# Patient Record
Sex: FEMALE | Race: WHITE | HISPANIC OR LATINO | URBAN - METROPOLITAN AREA
[De-identification: names, ages, dates, MRNs, and addresses within clinical notes are randomized per-mention and may not be internally consistent; named-entity substitution may affect disease eponyms.]

---

## 2017-01-23 ENCOUNTER — OUTPATIENT (OUTPATIENT)
Dept: OUTPATIENT SERVICES | Facility: HOSPITAL | Age: 52
LOS: 1 days | Discharge: HOME | End: 2017-01-23

## 2017-06-27 DIAGNOSIS — G44.59 OTHER COMPLICATED HEADACHE SYNDROME: ICD-10-CM

## 2017-11-13 ENCOUNTER — OUTPATIENT (OUTPATIENT)
Dept: OUTPATIENT SERVICES | Facility: HOSPITAL | Age: 52
LOS: 1 days | Discharge: HOME | End: 2017-11-13

## 2017-11-13 DIAGNOSIS — R10.11 RIGHT UPPER QUADRANT PAIN: ICD-10-CM

## 2017-12-07 ENCOUNTER — OUTPATIENT (OUTPATIENT)
Dept: OUTPATIENT SERVICES | Facility: HOSPITAL | Age: 52
LOS: 1 days | Discharge: HOME | End: 2017-12-07

## 2017-12-07 DIAGNOSIS — Z12.31 ENCOUNTER FOR SCREENING MAMMOGRAM FOR MALIGNANT NEOPLASM OF BREAST: ICD-10-CM

## 2018-01-30 ENCOUNTER — EMERGENCY (EMERGENCY)
Facility: HOSPITAL | Age: 53
LOS: 1 days | Discharge: HOME | End: 2018-01-30

## 2018-01-30 DIAGNOSIS — Z90.49 ACQUIRED ABSENCE OF OTHER SPECIFIED PARTS OF DIGESTIVE TRACT: ICD-10-CM

## 2018-01-30 DIAGNOSIS — R10.32 LEFT LOWER QUADRANT PAIN: ICD-10-CM

## 2018-01-30 DIAGNOSIS — N20.0 CALCULUS OF KIDNEY: ICD-10-CM

## 2018-02-07 PROBLEM — Z00.00 ENCOUNTER FOR PREVENTIVE HEALTH EXAMINATION: Status: ACTIVE | Noted: 2018-02-07

## 2018-02-21 ENCOUNTER — APPOINTMENT (OUTPATIENT)
Dept: UROLOGY | Facility: CLINIC | Age: 53
End: 2018-02-21

## 2019-01-13 ENCOUNTER — EMERGENCY (EMERGENCY)
Facility: HOSPITAL | Age: 54
LOS: 0 days | Discharge: HOME | End: 2019-01-13
Attending: EMERGENCY MEDICINE | Admitting: EMERGENCY MEDICINE

## 2019-01-13 VITALS
TEMPERATURE: 97 F | DIASTOLIC BLOOD PRESSURE: 83 MMHG | OXYGEN SATURATION: 97 % | RESPIRATION RATE: 18 BRPM | HEART RATE: 84 BPM | SYSTOLIC BLOOD PRESSURE: 151 MMHG

## 2019-01-13 DIAGNOSIS — R06.00 DYSPNEA, UNSPECIFIED: ICD-10-CM

## 2019-01-13 DIAGNOSIS — T78.1XXA OTHER ADVERSE FOOD REACTIONS, NOT ELSEWHERE CLASSIFIED, INITIAL ENCOUNTER: ICD-10-CM

## 2019-01-13 DIAGNOSIS — R11.10 VOMITING, UNSPECIFIED: ICD-10-CM

## 2019-01-13 DIAGNOSIS — Z87.891 PERSONAL HISTORY OF NICOTINE DEPENDENCE: ICD-10-CM

## 2019-01-13 DIAGNOSIS — Z88.2 ALLERGY STATUS TO SULFONAMIDES: ICD-10-CM

## 2019-01-13 DIAGNOSIS — K21.9 GASTRO-ESOPHAGEAL REFLUX DISEASE WITHOUT ESOPHAGITIS: ICD-10-CM

## 2019-01-13 RX ORDER — DEXAMETHASONE 0.5 MG/5ML
10 ELIXIR ORAL ONCE
Qty: 0 | Refills: 0 | Status: COMPLETED | OUTPATIENT
Start: 2019-01-13 | End: 2019-01-13

## 2019-01-13 RX ORDER — FAMOTIDINE 10 MG/ML
20 INJECTION INTRAVENOUS ONCE
Qty: 0 | Refills: 0 | Status: COMPLETED | OUTPATIENT
Start: 2019-01-13 | End: 2019-01-13

## 2019-01-13 RX ORDER — EPINEPHRINE 0.3 MG/.3ML
0.3 INJECTION INTRAMUSCULAR; SUBCUTANEOUS
Qty: 1 | Refills: 0 | OUTPATIENT
Start: 2019-01-13

## 2019-01-13 RX ADMIN — FAMOTIDINE 104 MILLIGRAM(S): 10 INJECTION INTRAVENOUS at 04:41

## 2019-01-13 RX ADMIN — Medication 102 MILLIGRAM(S): at 05:01

## 2019-01-13 NOTE — ED ADULT TRIAGE NOTE - CHIEF COMPLAINT QUOTE
Pt came c/o allergic reaction after eating strawberries at 10 pm, feels like her throat is closing and has a hoarse voice x 2 days.

## 2019-01-13 NOTE — ED ADULT NURSE NOTE - DOES PATIENT HAVE ADVANCE DIRECTIVE
No No complaints No complaints No complaints No complaints No complaints No complaints No complaints No complaints No complaints No complaints No complaints No complaints No complaints

## 2019-01-13 NOTE — ED PROVIDER NOTE - ATTENDING CONTRIBUTION TO CARE
52 yo female with viral laryngitis presenting with nasal congestion associated with feeling of thightness in her throat after eating a strawberry.  Denies skin rash, swelling of  lips/tongue, difficulties breathing or swallowing, no N/V or abdominal pain.   Took Benadryl PTA, in ED was further treated with resolutions of symptoms.  Nml exam other than hoarse voice which patient states has been present for days along with URI like symptoms .

## 2019-01-13 NOTE — ED PROVIDER NOTE - PHYSICAL EXAMINATION
CONST: Pt resting comfortably, horse voice  EYES: PERRL, EOMI, Sclera and conjunctiva clear.   ENT: No nasal discharge. TM's clear B/L without drainage. Oropharynx normal appearing, no erythema or exudates. No abscess or swelling. Uvula midline.  NECK: Non-tender, no meningeal signs. normal ROM. supple   CARD: S1 S2  RESP: Equal BS B/L, No wheezes, rhonchi or rales. No distress  GI: Soft, non-tender, non-distended. normal BS  MS: Normal ROM in all extremities. pulses 2 +. no calf tenderness or swelling  SKIN: Warm, dry, no acute rashes. Good turgor  NEURO: A&Ox3, No focal deficits. Strength 5/5 with no sensory deficits.

## 2019-01-13 NOTE — ED ADULT NURSE NOTE - NSIMPLEMENTINTERV_GEN_ALL_ED
Implemented All Universal Safety Interventions:  [place] to call system. Call bell, personal items and telephone within reach. Instruct patient to call for assistance. Room bathroom lighting operational. Non-slip footwear when patient is off stretcher. Physically safe environment: no spills, clutter or unnecessary equipment. Stretcher in lowest position, wheels locked, appropriate side rails in place.

## 2019-01-13 NOTE — ED PROVIDER NOTE - OBJECTIVE STATEMENT
53y F PMH Glaucoma, GERD and recently sick with viral infx presents with difficulty breathing. Pt states she vomited yesterday from her viral infx and has had a sore throat since but today she felt as through her throat was closing after eating strawberries. She had a previous episode after eating grapes in the past that spontaneously resolved. She was unable to fall asleep because of an increased pressure in her throat and difficulty breathing. She took Benadryl with minimal relief and came to the ED. Now she presents with a horse voice and contined pressure to the Left side of her throat. She denies fever, HA, change in vision, tongue swelling, rash, puritis, SOB, CP, n/d/c

## 2019-01-13 NOTE — ED PROVIDER NOTE - PROGRESS NOTE DETAILS
pt seen and examined; 53 yold female to ED Pmhx glaucoma, c/o hoarse voice, mild difficutly breathing, itchy throat started tonight after eating strawberries; has had similar reaction in past with grapes; pt took benedryl 50mg and sx have improved since arrival to Ed; pt deneis fever chills, cough, chest pain;   pt seen and examined; no stridor; pt given decadron, pepcid; will observe Eager to go home, acute symptoms resolved, d./c home, epipen prescribed.

## 2019-01-13 NOTE — ED PROVIDER NOTE - NSFOLLOWUPINSTRUCTIONS_ED_ALL_ED_FT
Follow up with primary care provider in 1-2 days.    Allergic Reaction    An allergic reaction is an abnormal reaction to a substance (allergen) by the body's defense system. Common allergens include medicines, food, insect bites or stings, and blood products. The body releases certain proteins into the blood that can cause a variety of symptoms such as an itchy rash, wheezing, swelling of the face/lips/tongue/throat, abdominal pain, nausea or vomiting. An allergic reaction is usually treated with medication. If your health care provider prescribed you an epinephrine injection device, make sure to keep it with you at all times.    SEEK IMMEDIATE MEDICAL CARE IF YOU HAVE THE FOLLOWING SYMPTOMS: allergic reaction severe enough that required you to use epinephrine, tightness in your chest, swelling around your lips/tongue/throat, abdominal pain, vomiting or diarrhea, or lightheadedness/dizziness. These symptoms may represent a serious problem that is an emergency. Do not wait to see if the symptoms will go away. Use your auto-injector pen or anaphylaxis kit as you have been instructed, and get medical help right away. Call your local emergency services (911 in the [place] Do not drive yourself to the hospital.

## 2019-01-13 NOTE — ED PROVIDER NOTE - MEDICAL DECISION MAKING DETAILS
54 yo female with suspected allergic reaction to strawberry.  Symptoms resolved, stable on d./c home.  Epipen prescribed,

## 2019-03-11 ENCOUNTER — OUTPATIENT (OUTPATIENT)
Dept: OUTPATIENT SERVICES | Facility: HOSPITAL | Age: 54
LOS: 1 days | Discharge: HOME | End: 2019-03-11

## 2019-03-11 DIAGNOSIS — Z12.31 ENCOUNTER FOR SCREENING MAMMOGRAM FOR MALIGNANT NEOPLASM OF BREAST: ICD-10-CM

## 2019-03-13 ENCOUNTER — OUTPATIENT (OUTPATIENT)
Dept: OUTPATIENT SERVICES | Facility: HOSPITAL | Age: 54
LOS: 1 days | Discharge: HOME | End: 2019-03-13

## 2019-03-15 DIAGNOSIS — Z13.820 ENCOUNTER FOR SCREENING FOR OSTEOPOROSIS: ICD-10-CM

## 2019-03-15 DIAGNOSIS — N95.9 UNSPECIFIED MENOPAUSAL AND PERIMENOPAUSAL DISORDER: ICD-10-CM

## 2020-03-13 ENCOUNTER — OUTPATIENT (OUTPATIENT)
Dept: OUTPATIENT SERVICES | Facility: HOSPITAL | Age: 55
LOS: 1 days | Discharge: HOME | End: 2020-03-13
Payer: COMMERCIAL

## 2020-03-13 DIAGNOSIS — Z12.31 ENCOUNTER FOR SCREENING MAMMOGRAM FOR MALIGNANT NEOPLASM OF BREAST: ICD-10-CM

## 2020-03-13 PROCEDURE — 77067 SCR MAMMO BI INCL CAD: CPT | Mod: 26

## 2020-03-13 PROCEDURE — 77063 BREAST TOMOSYNTHESIS BI: CPT | Mod: 26

## 2020-03-16 ENCOUNTER — OUTPATIENT (OUTPATIENT)
Dept: OUTPATIENT SERVICES | Facility: HOSPITAL | Age: 55
LOS: 1 days | Discharge: HOME | End: 2020-03-16
Payer: COMMERCIAL

## 2020-03-16 DIAGNOSIS — R92.8 OTHER ABNORMAL AND INCONCLUSIVE FINDINGS ON DIAGNOSTIC IMAGING OF BREAST: ICD-10-CM

## 2020-03-16 PROCEDURE — G0279: CPT | Mod: 26

## 2020-03-16 PROCEDURE — 76642 ULTRASOUND BREAST LIMITED: CPT | Mod: 26,RT

## 2020-03-16 PROCEDURE — 77065 DX MAMMO INCL CAD UNI: CPT | Mod: 26,RT

## 2020-04-04 ENCOUNTER — TRANSCRIPTION ENCOUNTER (OUTPATIENT)
Age: 55
End: 2020-04-04

## 2020-09-21 ENCOUNTER — OUTPATIENT (OUTPATIENT)
Dept: OUTPATIENT SERVICES | Facility: HOSPITAL | Age: 55
LOS: 1 days | Discharge: HOME | End: 2020-09-21
Payer: COMMERCIAL

## 2020-09-21 DIAGNOSIS — R92.8 OTHER ABNORMAL AND INCONCLUSIVE FINDINGS ON DIAGNOSTIC IMAGING OF BREAST: ICD-10-CM

## 2020-09-21 PROCEDURE — 76642 ULTRASOUND BREAST LIMITED: CPT | Mod: 26,RT

## 2021-01-08 ENCOUNTER — APPOINTMENT (OUTPATIENT)
Dept: BREAST CENTER | Facility: CLINIC | Age: 56
End: 2021-01-08
Payer: COMMERCIAL

## 2021-01-08 DIAGNOSIS — Z87.891 PERSONAL HISTORY OF NICOTINE DEPENDENCE: ICD-10-CM

## 2021-01-08 DIAGNOSIS — Z80.1 FAMILY HISTORY OF MALIGNANT NEOPLASM OF TRACHEA, BRONCHUS AND LUNG: ICD-10-CM

## 2021-01-08 DIAGNOSIS — N62 HYPERTROPHY OF BREAST: ICD-10-CM

## 2021-01-08 DIAGNOSIS — Z86.69 PERSONAL HISTORY OF OTHER DISEASES OF THE NERVOUS SYSTEM AND SENSE ORGANS: ICD-10-CM

## 2021-01-08 PROCEDURE — 99203 OFFICE O/P NEW LOW 30 MIN: CPT

## 2021-01-08 PROCEDURE — 99072 ADDL SUPL MATRL&STAF TM PHE: CPT

## 2021-01-08 RX ORDER — ACETAZOLAMIDE 250 MG/1
TABLET ORAL
Refills: 0 | Status: ACTIVE | COMMUNITY

## 2021-01-08 RX ORDER — IBUPROFEN AND FAMOTIDINE 800; 26.6 MG/1; MG/1
TABLET, COATED ORAL
Refills: 0 | Status: ACTIVE | COMMUNITY

## 2021-01-08 NOTE — PAST MEDICAL HISTORY
[Menarche Age ____] : age at menarche was [unfilled] [Menopause Age____] : age at menopause was [unfilled] [Total Preg ___] : G[unfilled] [Live Births ___] : P[unfilled]  [Age At Live Birth ___] : Age at live birth: [unfilled] [History of Hormone Replacement Treatment] : has no history of hormone replacement treatment [FreeTextEntry5] :   [FreeTextEntry6] : yes  [FreeTextEntry7] : denies [FreeTextEntry8] : yes x 6 months

## 2021-01-08 NOTE — DATA REVIEWED
[FreeTextEntry1] : EXAM: MG MAMMO SCREEN W ZOE BI#\par \par \par PROCEDURE DATE: 03/13/2020\par \par \par \par INTERPRETATION: HISTORY:\par Bilateral MG MAMMO SCREEN W ZOE BI# was performed. Patient is 54 years old\par and is seen for screening. The patient has no personal history of cancer.\par The patient has a history of right needle biopsy at age 48 - benign. The\par patient has no family history of breast cancer.\par \par RISK ASSESSMENT:\par NCI Lifetime Risk: 13.1\par Tyrer-Cuzick Lifetime Risk: 17.0\par \par CLINICAL BREAST EXAM:\par The patient reports her last clinical breast exam was performed within the\par past month.\par \par COMPARISON STUDIES:\par The present examination has been compared to prior imaging studies performed\par at [facility] on 11/25/2016, 12/07/2017 and\par 03/11/2019.\par \par MAMMOGRAM FINDINGS:\par Mammography was performed including the following views: bilateral\par craniocaudal with tomosynthesis, bilateral mediolateral oblique with\par tomosynthesis, and bilateral anterior mediolateral oblique. The examination\par includes digital synthetic 2D and digital tomosynthesis 3D images.\par Additional imaging analysis was performed using CAD (computer-aided\par detection) software.\par \par The breasts are heterogeneously dense, which may obscure small masses.\par \par There is a focal asymmetry seen in the inferior of the right breast at\par middle third, depth region.\par \par No suspicious mass, grouping of calcifications, or other abnormality is\par identified in the left breast.\par \par IMPRESSION:\par Focal asymmetry in the right breast requires additional evaluation.\par \par RECOMMENDATION:\par Patient will be recalled for additional mammographic views and, if\par indicated, breast ultrasound.\par \par ASSESSMENT:\par BI-RADS Category 0: Incomplete: Needs Additional Imaging Evaluation\par \par The patient will be notified of these results by telephone, and will also be\par mailed a written summary in layman's terms.\par \par \par \par \par \par \par MILLICENT ROLON M.D., RESIDENT RADIOLOGIST\par This document has been electronically signed.\par CARMENCITA BENOIT M.D., ATTENDING RADIOLOGIST\par This document has been electronically signed. Mar 13 2020 4:30PM\par \par EXAM: US BREAST LIMITED RT\par EXAM: MG MAMMO DIAG W ZOE RT#\par \par \par PROCEDURE DATE: 03/16/2020\par \par \par \par INTERPRETATION: Clinical History / Reason for exam: Focal asymmetry medial\par aspect of the right breast.\par \par The patient reports her last clinical breast examination was performed one\par month ago.\par \par Family history: No family history of breast cancer.\par \par Comparisons: Mammograms dating back to 2017.\par \par Views obtained: right full field digital 2D and digital tomosynthesis images\par as well as spot views.\par \par Computer-aided detection was utilized in the interpretation of this\par examination.\par \par Breast composition: The breasts are heterogeneously dense, which may obscure\par small masses.\par \par Findings:\par \par Mammogram:\par The area of mammographic concern in the central right breast effaces on spot\par views likely normal fibroglandular tissue.\par No suspicious mass, microcalcifications or areas of architectural distortion\par is seen the right breast.\par \par Ultrasound:\par \par Targeted unilateral right breast ultrasound was performed.\par \par No sonographic abnormality noted at the area of mammographic concern in the\par central right breast.\par Incidentally noted at 1:00 N5 is an oval circumscribed mass measuring 0.9 x\par 0.8 x 0.3 cm. Short interval follow-up with ultrasound recommended.\par \par Impression:\par Oval circumscribed mass (sonographically occult) 1:00 right breast. Short\par interval follow-up with ultrasound recommended.\par \par Recommendation: Follow-up breast ultrasound in 6 months.\par \par BI-RADS category 3: Probably Benign\par \par \par \par The above findings and recommendations were discussed with the patient at\par the time of the examination.\par \par \par \par \par \par \par CARMENCITA BENOIT M.D., ATTENDING RADIOLOGIST\par This document has been electronically signed. Mar 16 2020 3:54PM\par \par EXAM: MG US BREAST LIMITED RT\par \par \par PROCEDURE DATE: 09/21/2020\par \par \par \par INTERPRETATION: Clinical History / Reason for exam: Follow-up right breast mass.\par \par The patient reports her last clinical breast examination was performed about uncertain.\par \par Family history: There is no family history of breast cancer.\par \par Comparisons: Breast ultrasound dating back to 2016.\par \par Findings:\par \par Ultrasound:\par \par Targeted unilateral right breast ultrasound was performed.\par \par Right breast:\par At 1:00 N5 there is redemonstration of stable oval circumscribed mass measuring 0.9 x 0.4 x 0.3 cm. Short interval follow-up recommended\par \par Impression: Stable right 1:00 mass as described above. Short interval follow-up recommended.\par \par Recommendation: Follow-up bilateral diagnostic mammogram and ultrasound in 6 months.\par \par BI-RADS category 3: Probably Benign\par \par \par The above findings and recommendations were discussed with the patient at the time of the examination.\par \par \par \par \par \par CARMENCITA BENOIT M.D., ATTENDING RADIOLOGIST\par This document has been electronically signed. Sep 21 2020 2:01PM

## 2021-01-08 NOTE — ASSESSMENT
[FreeTextEntry1] : Estela is a 55 F with a R breast BIRADS 3 abnormality. \par \par There was no suspicious abnormalities palpated within either breast on exam today. \par Her most recent imaging was a R breast US on 9/21/2020 which revealed a stable oval mass @1N5, measuring 9 x 4 x 3 mm, deemed BIRADS 3. \par \par She will be due for a b/l dx mammogram and US on 3/13/2021.  This will be scheduled for her today.  I will have her follow up in 6 months if this imaging is unrevealing. \par \par We discussed BIRADS 3 lesions.  These lesions have a 2% chance of harboring malignancy.  There is always an option to obtain a tissue sample with a biopsy to confirm the diagnosis.   The procedure was described in detail including the placement of a tissue marker clip.  The risks of the procedure, including but not limited to bleeding and infection were also explained.  She is not interested in biopsy at this time and agrees to continue with short-term interval imaging.\par \par We discussed dense breasts.  Increasing breast density has been found to increase ones risk of breast cancer, but at this time, there is no clear indication for additional imaging in this setting, as both US and MRI have not been found to improve survival.  One can consider bilateral screening US.  However, out of 1000 women screened, the use of routine US will only identify an additional 3-5 cancers.  The use of US was found to increase the likelihood of undergoing more imaging and more biopsies.  She does have dense breasts.  We have decided to proceed with screening bilateral breast US at this time.  This will be scheduled with her next screening mammogram.\par \par She is otherwise at an average risk for breast cancer and should continue with annual screening mammograms and US. \par \par All of her questions were answered.  She knows to call with any further questions or concerns. \par \par PLAN: \par -plastic surgery referral for possible breast reduction\par -b/l dx mammogram and US On 3/13/2021\par -if above imaging is unrevealing, then she can follow up in 6 months for a CBE

## 2021-01-08 NOTE — HISTORY OF PRESENT ILLNESS
[FreeTextEntry1] : Estela is a 55 F with a R breast BIRADS 3 abnormality. \par \par Her work up was as follows: \par 3/13/2020 -- b/l screening mammogram \par -heterogenously dense breasts\par -focal asymmetry in inferior R breast\par -no suspicious mass, microcalcifications or architectural distortion in L \par BIRADS 0 \par \par 3/16/2020 -- R dx mammogram and US \par -asymmetry effaces on spot compression, benign \par R US \par -@1N5, incidental oval circumscribed mass, measures 9 x 8 x 3 mm \par BIRADS 3\par \par 2020 -- R US \par -@1N5, stable oval mass, measuring 9 x 4 x 3 mm \par BIRADS 3. \par \par She has no breast related complaints at this time.  She denies any breast pain, has not palpated any new palpable masses in either breast and denies any nipple discharge or retraction.  She does have chronic neck, shoulder and back pain, partially due to the weight of her breasts. \par \par HISTORICAL RISK FACTORS: \par -prior R breast biopsy in past, reportedly benign, no prior surgeries \par -no family history of breast or ovarian cancer \par -, age at first live birth was 39 \par -no prior OCP use \par -no gyn surgeries\par

## 2021-01-08 NOTE — PHYSICAL EXAM
[Normocephalic] : normocephalic [Atraumatic] : atraumatic [EOMI] : extra ocular movement intact [No Supraclavicular Adenopathy] : no supraclavicular adenopathy [No Cervical Adenopathy] : no cervical adenopathy [Examined in the supine and seated position] : examined in the supine and seated position [Symmetrical] : symmetrical [No dominant masses] : no dominant masses in right breast  [No dominant masses] : no dominant masses left breast [No Nipple Retraction] : no left nipple retraction [No Nipple Discharge] : no left nipple discharge [No Axillary Lymphadenopathy] : no left axillary lymphadenopathy [Soft] : abdomen soft [Not Tender] : non-tender [No Edema] : no edema [No Rashes] : no rashes [No Ulceration] : no ulceration [de-identified] : no suspicious abnormalities palpated within either breast

## 2021-03-22 ENCOUNTER — OUTPATIENT (OUTPATIENT)
Dept: OUTPATIENT SERVICES | Facility: HOSPITAL | Age: 56
LOS: 1 days | Discharge: HOME | End: 2021-03-22
Payer: COMMERCIAL

## 2021-03-22 DIAGNOSIS — R92.8 OTHER ABNORMAL AND INCONCLUSIVE FINDINGS ON DIAGNOSTIC IMAGING OF BREAST: ICD-10-CM

## 2021-03-22 PROCEDURE — 77066 DX MAMMO INCL CAD BI: CPT | Mod: 26

## 2021-03-22 PROCEDURE — 76641 ULTRASOUND BREAST COMPLETE: CPT | Mod: 26,50

## 2021-03-22 PROCEDURE — G0279: CPT | Mod: 26

## 2021-03-23 DIAGNOSIS — Z78.0 ASYMPTOMATIC MENOPAUSAL STATE: ICD-10-CM

## 2021-03-23 DIAGNOSIS — M89.9 DISORDER OF BONE, UNSPECIFIED: ICD-10-CM

## 2021-03-23 DIAGNOSIS — Z13.820 ENCOUNTER FOR SCREENING FOR OSTEOPOROSIS: ICD-10-CM

## 2021-07-01 ENCOUNTER — OUTPATIENT (OUTPATIENT)
Dept: OUTPATIENT SERVICES | Facility: HOSPITAL | Age: 56
LOS: 1 days | Discharge: HOME | End: 2021-07-01
Payer: COMMERCIAL

## 2021-07-01 DIAGNOSIS — N20.0 CALCULUS OF KIDNEY: ICD-10-CM

## 2021-07-01 PROCEDURE — 76770 US EXAM ABDO BACK WALL COMP: CPT | Mod: 26

## 2021-09-22 ENCOUNTER — OUTPATIENT (OUTPATIENT)
Dept: OUTPATIENT SERVICES | Facility: HOSPITAL | Age: 56
LOS: 1 days | Discharge: HOME | End: 2021-09-22
Payer: COMMERCIAL

## 2021-09-22 DIAGNOSIS — R92.2 INCONCLUSIVE MAMMOGRAM: ICD-10-CM

## 2021-09-22 PROCEDURE — 76642 ULTRASOUND BREAST LIMITED: CPT | Mod: 26,50

## 2021-12-07 ENCOUNTER — APPOINTMENT (OUTPATIENT)
Dept: BREAST CENTER | Facility: CLINIC | Age: 56
End: 2021-12-07
Payer: COMMERCIAL

## 2021-12-07 VITALS
SYSTOLIC BLOOD PRESSURE: 165 MMHG | DIASTOLIC BLOOD PRESSURE: 93 MMHG | BODY MASS INDEX: 29.57 KG/M2 | WEIGHT: 184 LBS | TEMPERATURE: 97.3 F | HEIGHT: 66 IN

## 2021-12-07 PROCEDURE — 99212 OFFICE O/P EST SF 10 MIN: CPT

## 2021-12-07 NOTE — PHYSICAL EXAM
[Normocephalic] : normocephalic [Atraumatic] : atraumatic [EOMI] : extra ocular movement intact [No Supraclavicular Adenopathy] : no supraclavicular adenopathy [No Cervical Adenopathy] : no cervical adenopathy [Examined in the supine and seated position] : examined in the supine and seated position [Symmetrical] : symmetrical [No dominant masses] : no dominant masses in right breast  [No dominant masses] : no dominant masses left breast [No Nipple Retraction] : no left nipple retraction [No Nipple Discharge] : no left nipple discharge [No Axillary Lymphadenopathy] : no left axillary lymphadenopathy [Soft] : abdomen soft [Not Tender] : non-tender [No Edema] : no edema [No Rashes] : no rashes [No Ulceration] : no ulceration [de-identified] : no suspicious abnormalities palpated within either breast

## 2021-12-07 NOTE — HISTORY OF PRESENT ILLNESS
[FreeTextEntry1] : Estela is a 55 F with a R breast BIRADS 3 abnormality. \par \par Her work up was as follows: \par 3/13/2020 -- b/l screening mammogram \par -heterogenously dense breasts\par -focal asymmetry in inferior R breast\par -no suspicious mass, microcalcifications or architectural distortion in L \par BIRADS 0 \par \par 3/16/2020 -- R dx mammogram and US \par -asymmetry effaces on spot compression, benign \par R US \par -@1N5, incidental oval circumscribed mass, measures 9 x 8 x 3 mm \par BIRADS 3\par \par 2020 -- R US \par -@1N5, stable oval mass, measuring 9 x 4 x 3 mm \par BIRADS 3. \par \par She has no breast related complaints at this time.  She denies any breast pain, has not palpated any new palpable masses in either breast and denies any nipple discharge or retraction.  She does have chronic neck, shoulder and back pain, partially due to the weight of her breasts. \par \par HISTORICAL RISK FACTORS: \par -prior R breast biopsy in past, reportedly benign, no prior surgeries \par -no family history of breast or ovarian cancer \par -, age at first live birth was 39 \par -no prior OCP use \par -no gyn surgeries\par \par \par INTERVAL HISTORY:\par 2021 --\par \par Estela is a 56 F with a BIRADS 3 abnormality. \par She has no breast related complaints at this time.  She did have some right breast pain but this has resolved, she has not palpated any new masses in either breast and denies any nipple discharge or retraction.\par \par Her most recent imaging is as follows:\par B/L Breast Sono - 2021:\par Right breast:\par -At 1:00 N5 there is redemonstration of stable oval circumscribed mass measuring 0.6 x 0.8 x 0.4 cm. Short interval follow-up recommended. This is been stable for 18 months, supporting a probably benign etiology.\par Left breast:\par At 10:00 N5 there is redemonstration of a stable oval circumscribed mass measuring 0.9 x 0.8 x 0.4 cm. Short interval follow-up recommended. This is been stable for 6 months, supporting a probably benign etiology.\par BI-RADS category 3: Probably Benign\par \par

## 2021-12-07 NOTE — DATA REVIEWED
[FreeTextEntry1] : EXAM:  MG US BREAST LIMITED BI      \par \par \par PROCEDURE DATE:  09/22/2021  \par \par \par \par INTERPRETATION:  Clinical History / Reason for exam: Short-term follow-up bilateral breast masses.\par \par Right breast:\par At 1:00 N5 there is redemonstration of stable oval circumscribed mass measuring 0.6 x 0.8 x 0.4 cm. Short interval follow-up recommended.\par This is been stable for 18 months, supporting a probably benign etiology.\par \par Left breast:\par At 10:00 N5 there is redemonstration of a stable oval circumscribed mass measuring 0.9 x 0.8 x 0.4 cm. Short interval follow-up recommended. This is been stable for 6 months, supporting a probably benign etiology.\par \par IMPRESSION: Probably benign bilateral breast masses.\par \par Recommendation: Follow-up breast ultrasound in 6 months. At that time the patient will be due for annual mammography.\par \par BI-RADS category 3: Probably Benign\par \par The above findings and recommendations were discussed with the patient at the time of the exam. She is indicating that she might opt for immediate biopsy as opposed to continued short-term follow-up.\par \par

## 2021-12-07 NOTE — ASSESSMENT
[FreeTextEntry1] : Estela is a 56 F with a  BIRADS 3 abnormality. \par \par There was no suspicious abnormalities palpated within either breast on exam today. \par \par Her most recent imaging is as follows:\par B/L Breast Sono - 09/22/2021:\par Right breast:\par -At 1:00 N5 there is redemonstration of stable oval circumscribed mass measuring 0.6 x 0.8 x 0.4 cm. Short interval follow-up recommended. This is been stable for 18 months, supporting a probably benign etiology.\par Left breast:\par At 10:00 N5 there is redemonstration of a stable oval circumscribed mass measuring 0.9 x 0.8 x 0.4 cm. Short interval follow-up recommended. This is been stable for 6 months, supporting a probably benign etiology.\par BI-RADS category 3: Probably Benign\par \par \par She will be due for a b/l dx mammogram and US on 3/13/2022.  This will be scheduled for her today.  I will have her follow up after.\par \par We discussed BIRADS 3 lesions.  These lesions have a 2% chance of harboring malignancy.  There is always an option to obtain a tissue sample with a biopsy to confirm the diagnosis.   The procedure was described in detail including the placement of a tissue marker clip.  The risks of the procedure, including but not limited to bleeding and infection were also explained.  She is not interested in biopsy at this time and agrees to continue with short-term interval imaging.\par \par We discussed dense breasts.  Increasing breast density has been found to increase ones risk of breast cancer, but at this time, there is no clear indication for additional imaging in this setting, as both US and MRI have not been found to improve survival.  One can consider bilateral screening US.  However, out of 1000 women screened, the use of routine US will only identify an additional 3-5 cancers.  The use of US was found to increase the likelihood of undergoing more imaging and more biopsies.  She does have dense breasts.  We have decided to proceed with screening bilateral breast US at this time.  This will be scheduled with her next screening mammogram.\par \par She is otherwise at an average risk for breast cancer and should continue with annual screening mammograms and US. \par \par All of her questions were answered.  She knows to call with any further questions or concerns. \par \par PLAN: \par -B/L Dx Mammo & Sono - March 2022\par -follow up after

## 2022-02-20 ENCOUNTER — NON-APPOINTMENT (OUTPATIENT)
Age: 57
End: 2022-02-20

## 2022-02-22 ENCOUNTER — APPOINTMENT (OUTPATIENT)
Dept: OBGYN | Facility: CLINIC | Age: 57
End: 2022-02-22
Payer: COMMERCIAL

## 2022-02-22 VITALS — TEMPERATURE: 97.6 F | HEIGHT: 66 IN | WEIGHT: 190 LBS | BODY MASS INDEX: 30.53 KG/M2

## 2022-02-22 DIAGNOSIS — N95.0 POSTMENOPAUSAL BLEEDING: ICD-10-CM

## 2022-02-22 DIAGNOSIS — Z87.19 PERSONAL HISTORY OF OTHER DISEASES OF THE DIGESTIVE SYSTEM: ICD-10-CM

## 2022-02-22 DIAGNOSIS — Z87.09 PERSONAL HISTORY OF OTHER DISEASES OF THE RESPIRATORY SYSTEM: ICD-10-CM

## 2022-02-22 PROCEDURE — 76830 TRANSVAGINAL US NON-OB: CPT

## 2022-02-22 PROCEDURE — 99213 OFFICE O/P EST LOW 20 MIN: CPT | Mod: 25

## 2022-02-22 NOTE — HISTORY OF PRESENT ILLNESS
[FreeTextEntry1] : 56-year-old -0-2-1 here for follow-up GYN visit.  She underwent menopause in  and did not experience any bleeding until recently when last week when she was applying vaginal lubricant that she gets over-the-counter to help with her menopausal dryness and she noticed blood on her finger that was bright red.  The bleeding stopped on its own that day and it has not since recurred.

## 2022-02-22 NOTE — PROCEDURE
[Transvaginal Ultrasound] : transvaginal ultrasound [L: ___ cm] : L: [unfilled] cm [W: ___cm] : W: [unfilled] cm [H: ___ cm] : H: [unfilled] cm [FreeTextEntry9] : Postmenopausal bleeding [FreeTextEntry5] : 38 cc, 2.2 mm EMS, homogenous appearing, normal-appearing cervix, normal-appearing cul-de-sac [FreeTextEntry7] : 1.5 x 1.4 x 1.9 cm, normal-appearing [FreeTextEntry8] : 1.8 x 1.6 x 1.2 cm, normal-appearing

## 2022-02-22 NOTE — PHYSICAL EXAM
[Chaperone Present] : A chaperone was present in the examining room during all aspects of the physical examination [Appropriately responsive] : appropriately responsive [Alert] : alert [No Acute Distress] : no acute distress [Oriented x3] : oriented x3 [Labia Majora] : normal [Labia Minora] : normal [Normal] : normal [Uterine Adnexae] : normal [FreeTextEntry5] : Nonlabored [Vulvar Atrophy] : vulvar atrophy [Atrophy] : atrophy

## 2022-02-22 NOTE — PLAN
[FreeTextEntry1] : Transvaginal ultrasound done in office to better visualize pelvic anatomic structure overall unremarkable, thin homogenous appearing endometrial lining measuring 2.2 mm, will continue to monitor.  We discussed that given ultrasound findings at this point no tissue sampling is required however if she does continue to bleed endometrial sampling will be necessary for further evaluation and pathologic diagnosis.

## 2022-03-03 ENCOUNTER — APPOINTMENT (OUTPATIENT)
Dept: OBGYN | Facility: CLINIC | Age: 57
End: 2022-03-03
Payer: COMMERCIAL

## 2022-03-03 VITALS
HEART RATE: 72 BPM | SYSTOLIC BLOOD PRESSURE: 122 MMHG | DIASTOLIC BLOOD PRESSURE: 80 MMHG | HEIGHT: 66 IN | BODY MASS INDEX: 30.22 KG/M2 | TEMPERATURE: 98 F | WEIGHT: 188 LBS

## 2022-03-03 DIAGNOSIS — N95.2 POSTMENOPAUSAL ATROPHIC VAGINITIS: ICD-10-CM

## 2022-03-03 DIAGNOSIS — R82.998 OTHER ABNORMAL FINDINGS IN URINE: ICD-10-CM

## 2022-03-03 LAB
BILIRUB UR QL STRIP: NORMAL
CLARITY UR: NORMAL
COLLECTION METHOD: NORMAL
GLUCOSE UR-MCNC: NORMAL
HCG UR QL: 0.2 EU/DL
HGB UR QL STRIP.AUTO: NORMAL
KETONES UR-MCNC: NORMAL
LEUKOCYTE ESTERASE UR QL STRIP: NORMAL
NITRITE UR QL STRIP: NORMAL
PH UR STRIP: 6.5
PROT UR STRIP-MCNC: NORMAL
SP GR UR STRIP: 1.02

## 2022-03-03 PROCEDURE — 99396 PREV VISIT EST AGE 40-64: CPT

## 2022-03-03 PROCEDURE — 81003 URINALYSIS AUTO W/O SCOPE: CPT | Mod: QW

## 2022-03-03 NOTE — PHYSICAL EXAM
[Chaperone Present] : A chaperone was present in the examining room during all aspects of the physical examination [Appropriately responsive] : appropriately responsive [Alert] : alert [No Acute Distress] : no acute distress [Soft] : soft [Non-tender] : non-tender [Non-distended] : non-distended [Examination Of The Breasts] : a normal appearance [No Masses] : no breast masses were palpable [Vulvar Atrophy] : vulvar atrophy [Labia Majora] : normal [Labia Minora] : normal [Atrophy] : atrophy [No Bleeding] : There was no active vaginal bleeding [Normal] : normal [Uterine Adnexae] : normal

## 2022-03-03 NOTE — DISCUSSION/SUMMARY
[FreeTextEntry1] : instructions for use of new medication reviewed with patient\par started pt on premarin vaginal cream  pt seen last week , saw scant bleeding,  most likely from vaginal applicator \par see note

## 2022-03-03 NOTE — HISTORY OF PRESENT ILLNESS
[postmenopausal] : postmenopausal [N] : Patient does not use contraception [Y] : Patient is sexually active [Monogamous (Male Partner)] : is monogamous with a male partner [Currently In Menopause] : currently in menopause [Menopause Age: ____] : age at menopause was [unfilled] [Yes] : Patient has concerns regarding sex [Currently Active] : currently active [Men] : men [No] : No [Difficulty with [place]] : difficulty with intercourse [Vaginal Lubrication] : vaginal lubrication [TextBox_18] : using vaginal moisturizer , helps a little [FreeTextEntry1] : dyspareunia

## 2022-03-05 LAB
APPEARANCE: CLEAR
BACTERIA UR CULT: NORMAL
BACTERIA: NEGATIVE
BILIRUBIN URINE: NEGATIVE
BLOOD URINE: NEGATIVE
COLOR: NORMAL
GLUCOSE QUALITATIVE U: NEGATIVE
HYALINE CASTS: 2 /LPF
KETONES URINE: NEGATIVE
LEUKOCYTE ESTERASE URINE: ABNORMAL
MICROSCOPIC-UA: NORMAL
NITRITE URINE: NEGATIVE
PH URINE: 6.5
PROTEIN URINE: NEGATIVE
RED BLOOD CELLS URINE: 1 /HPF
SPECIFIC GRAVITY URINE: 1.02
SQUAMOUS EPITHELIAL CELLS: 1 /HPF
UROBILINOGEN URINE: NORMAL
WHITE BLOOD CELLS URINE: 5 /HPF

## 2022-03-10 ENCOUNTER — APPOINTMENT (OUTPATIENT)
Dept: RHEUMATOLOGY | Facility: CLINIC | Age: 57
End: 2022-03-10
Payer: COMMERCIAL

## 2022-03-10 VITALS
OXYGEN SATURATION: 96 % | TEMPERATURE: 97.8 F | DIASTOLIC BLOOD PRESSURE: 80 MMHG | WEIGHT: 185 LBS | HEIGHT: 66 IN | BODY MASS INDEX: 29.73 KG/M2 | SYSTOLIC BLOOD PRESSURE: 120 MMHG | HEART RATE: 81 BPM

## 2022-03-10 DIAGNOSIS — M51.26 OTHER INTERVERTEBRAL DISC DISPLACEMENT, LUMBAR REGION: ICD-10-CM

## 2022-03-10 DIAGNOSIS — R76.8 OTHER SPECIFIED ABNORMAL IMMUNOLOGICAL FINDINGS IN SERUM: ICD-10-CM

## 2022-03-10 DIAGNOSIS — Z86.39 PERSONAL HISTORY OF OTHER ENDOCRINE, NUTRITIONAL AND METABOLIC DISEASE: ICD-10-CM

## 2022-03-10 PROCEDURE — 99205 OFFICE O/P NEW HI 60 MIN: CPT

## 2022-03-10 RX ORDER — BRIMONIDINE TARTRATE, TIMOLOL MALEATE 2; 5 MG/ML; MG/ML
SOLUTION/ DROPS OPHTHALMIC
Refills: 0 | Status: ACTIVE | COMMUNITY

## 2022-03-10 RX ORDER — MULTIVIT-MIN/IRON/FOLIC ACID/K 18-600-40
400 CAPSULE ORAL
Refills: 0 | Status: ACTIVE | COMMUNITY

## 2022-03-10 RX ORDER — ASCORBIC ACID 500 MG
500 TABLET ORAL
Refills: 0 | Status: ACTIVE | COMMUNITY

## 2022-03-10 RX ORDER — NETARSUDIL AND LATANOPROST OPHTHALMIC SOLUTION, 0.02%/0.005% .2; .05 MG/ML; MG/ML
SOLUTION/ DROPS OPHTHALMIC; TOPICAL
Refills: 0 | Status: ACTIVE | COMMUNITY

## 2022-03-10 RX ORDER — POTASSIUM CITRATE 10 MEQ/1
10 MEQ TABLET, EXTENDED RELEASE ORAL
Refills: 0 | Status: ACTIVE | COMMUNITY

## 2022-03-10 RX ORDER — BROMFENAC SODIUM 0.7 MG/ML
SOLUTION/ DROPS OPHTHALMIC
Refills: 0 | Status: ACTIVE | COMMUNITY

## 2022-03-10 NOTE — PHYSICAL EXAM
[General Appearance - Alert] : alert [General Appearance - In No Acute Distress] : in no acute distress [Sclera] : the sclera and conjunctiva were normal [PERRL With Normal Accommodation] : pupils were equal in size, round, and reactive to light [Extraocular Movements] : extraocular movements were intact [Outer Ear] : the ears and nose were normal in appearance [Oropharynx] : the oropharynx was normal [Neck Appearance] : the appearance of the neck was normal [Neck Cervical Mass (___cm)] : no neck mass was observed [Jugular Venous Distention Increased] : there was no jugular-venous distention [Thyroid Diffuse Enlargement] : the thyroid was not enlarged [Thyroid Nodule] : there were no palpable thyroid nodules [Auscultation Breath Sounds / Voice Sounds] : lungs were clear to auscultation bilaterally [Heart Rate And Rhythm] : heart rate was normal and rhythm regular [Heart Sounds] : normal S1 and S2 [Heart Sounds Gallop] : no gallops [Murmurs] : no murmurs [Heart Sounds Pericardial Friction Rub] : no pericardial rub [Bowel Sounds] : normal bowel sounds [Abdomen Soft] : soft [Abdomen Tenderness] : non-tender [Abdomen Mass (___ Cm)] : no abdominal mass palpated [Abnormal Walk] : normal gait [Nail Clubbing] : no clubbing  or cyanosis of the fingernails [Musculoskeletal - Swelling] : no joint swelling seen [Motor Tone] : muscle strength and tone were normal [] : no rash [Affect] : the affect was normal [Mood] : the mood was normal

## 2022-03-12 LAB — CYTOLOGY CVX/VAG DOC THIN PREP: ABNORMAL

## 2022-03-14 ENCOUNTER — RESULT REVIEW (OUTPATIENT)
Age: 57
End: 2022-03-14

## 2022-03-14 ENCOUNTER — OUTPATIENT (OUTPATIENT)
Dept: OUTPATIENT SERVICES | Facility: HOSPITAL | Age: 57
LOS: 1 days | Discharge: HOME | End: 2022-03-14
Payer: COMMERCIAL

## 2022-03-14 DIAGNOSIS — R92.8 OTHER ABNORMAL AND INCONCLUSIVE FINDINGS ON DIAGNOSTIC IMAGING OF BREAST: ICD-10-CM

## 2022-03-14 PROCEDURE — G0279: CPT | Mod: 26

## 2022-03-14 PROCEDURE — 76641 ULTRASOUND BREAST COMPLETE: CPT | Mod: 26,50

## 2022-03-14 PROCEDURE — 77066 DX MAMMO INCL CAD BI: CPT | Mod: 26

## 2022-03-14 PROCEDURE — 77062 BREAST TOMOSYNTHESIS BI: CPT | Mod: 26

## 2022-03-14 NOTE — HISTORY OF PRESENT ILLNESS
[FreeTextEntry1] : 56 year old female here for positive LEANNA 1:40\par \par \par SHe has neck pain, left arm pain from herniated disc in her cervical and lumbar spine. She sees neurology and pain management for this. She has gone to PT for this hasn't gone in 1 year. SHe was supposed to get nerve ablation in January but deferred due to elevated ocular pressure s/p surgery. She is taking care of her older sister who passed in December. \par \par Reports fatigue, chills, pruritus, muscle spasms\par Denies joint pain, fevers, weight loss, +night sweats, rash, photosensitivity, raynaud's, oral ulcers, nasal ulcers, hair loss, sinus problems, nosebleeds, visual disturbances, +dry eyes, dry mouth

## 2022-03-14 NOTE — ASSESSMENT
[FreeTextEntry1] : 56 year old female who presents for evaluation of LEANNA 1:40\par \par 1. Positive LEANNA\par -Fatigue, myalgias, arm pain\par -Complete serologies for RA, SLE, connective tissue disease\par -F/u 1 year if normal

## 2022-03-24 ENCOUNTER — APPOINTMENT (OUTPATIENT)
Dept: BREAST CENTER | Facility: CLINIC | Age: 57
End: 2022-03-24
Payer: COMMERCIAL

## 2022-03-28 ENCOUNTER — APPOINTMENT (OUTPATIENT)
Dept: BREAST CENTER | Facility: CLINIC | Age: 57
End: 2022-03-28
Payer: COMMERCIAL

## 2022-03-28 VITALS
HEIGHT: 66 IN | BODY MASS INDEX: 29.73 KG/M2 | DIASTOLIC BLOOD PRESSURE: 88 MMHG | WEIGHT: 185 LBS | TEMPERATURE: 97.3 F | SYSTOLIC BLOOD PRESSURE: 138 MMHG

## 2022-03-28 DIAGNOSIS — R92.2 INCONCLUSIVE MAMMOGRAM: ICD-10-CM

## 2022-03-28 DIAGNOSIS — R92.8 OTHER ABNORMAL AND INCONCLUSIVE FINDINGS ON DIAGNOSTIC IMAGING OF BREAST: ICD-10-CM

## 2022-03-28 PROCEDURE — 99212 OFFICE O/P EST SF 10 MIN: CPT

## 2022-03-28 NOTE — PHYSICAL EXAM
[Normocephalic] : normocephalic [Atraumatic] : atraumatic [EOMI] : extra ocular movement intact [Examined in the supine and seated position] : examined in the supine and seated position [Symmetrical] : symmetrical [No dominant masses] : no dominant masses in right breast  [No dominant masses] : no dominant masses left breast [No Nipple Retraction] : no left nipple retraction [No Nipple Discharge] : no left nipple discharge [No Axillary Lymphadenopathy] : no left axillary lymphadenopathy [No Edema] : no edema [No Rashes] : no rashes [No Ulceration] : no ulceration [de-identified] : On physical exam, there are no discrete masses in either breast or axilla. There is no nipple discharge or inversion bilaterally. There are no skin changes bilaterally.\par \par  [de-identified] : accessory breast tissue noted on inspection bilaterally

## 2022-03-28 NOTE — DATA REVIEWED
[FreeTextEntry1] : EXAM:  MG US BREAST COMPLETE BI\par EXAM:  MG MAMMO DIAG W ZOE BI#\par \par \par PROCEDURE DATE:  03/14/2022\par \par \par \par INTERPRETATION:  Clinical History / Reason for exam: Patient presents for follow-up of probably benign bilateral breast masses since March 2020.\par \par The patient reports her last clinical breast examination was performed one month ago.\par \par Diagnostic bilateral mammogram including tomography was performed and submitted for evaluation.\par \par Computer-aided detection was utilized in the interpretation of this examination.\par \par Comparison is made to the prior examinations dating back to 2016.\par \par Breast composition:The breasts are heterogeneously dense, which may obscure small masses.\par \par No suspicious masses, areas of architectural distortion or abnormal groups of microcalcifications are seen in either breast.\par \par Targeted Bilateral Breast Sonogram:\par \par Again identified are stable probably benign oval circumscribed hypoechoic masses in both breasts and are as follows:\par \par RIGHT BREAST:\par \par 1:00 location 5 cm from the nipple measuring 0.9 cm x 0.6 cm x 0.3 cm.\par \par There is also a previously biopsied mass at the 9-10 o'clock location 4 cm from the nipple which is without change measuring 0.9 cm x 1.0 cm x 0.4 cm.\par \par LEFT BREAST:\par \par 10:00 location 5 cm the nipple measuring 0.8 cm x 0.8 cm x 0.4 cm.\par \par These masses have demonstrated greater than 2 years of stability and are benign.\par \par Evaluation of both axillary regions demonstrates normal-appearing lymph nodes.\par \par Impression: Benign oval circumscribed hypoechoic mass in the right breast at the 1:00 location 5 cm the the nipple and left breast at the 10:00 location 5 cm from the nipple which are without significant change since March 2020 as above.\par \par No evidence of malignancy.\par \par Recommendation: Unless otherwise indicated by clinical findings, annual screening mammography recommended.\par \par BI-RADS Category 2: Benign\par \par

## 2022-03-28 NOTE — PAST MEDICAL HISTORY
[History of Hormone Replacement Treatment] : has no history of hormone replacement treatment [FreeTextEntry5] :   [FreeTextEntry6] : yes  [FreeTextEntry7] : denies [FreeTextEntry8] : yes x 6 months

## 2022-03-28 NOTE — HISTORY OF PRESENT ILLNESS
[FreeTextEntry1] : Estela is a 55 F with a R breast BIRADS 3 abnormality. \par \par Her work up was as follows: \par 3/13/2020 -- b/l screening mammogram \par -heterogenously dense breasts\par -focal asymmetry in inferior R breast\par -no suspicious mass, microcalcifications or architectural distortion in L \par BIRADS 0 \par \par 3/16/2020 -- R dx mammogram and US \par -asymmetry effaces on spot compression, benign \par R US \par -@1N5, incidental oval circumscribed mass, measures 9 x 8 x 3 mm \par BIRADS 3\par \par 2020 -- R US \par -@1N5, stable oval mass, measuring 9 x 4 x 3 mm \par BIRADS 3. \par \par She has no breast related complaints at this time.  She denies any breast pain, has not palpated any new palpable masses in either breast and denies any nipple discharge or retraction.  She does have chronic neck, shoulder and back pain, partially due to the weight of her breasts. \par \par HISTORICAL RISK FACTORS: \par -prior R breast biopsy in past, reportedly benign, no prior surgeries \par -no family history of breast or ovarian cancer \par -, age at first live birth was 39 \par -no prior OCP use \par -no gyn surgeries\par \par \par INTERVAL HISTORY:\par 2021 --\par \par Estela is a 56 F with a BIRADS 3 abnormality. \par She has no breast related complaints at this time.  She did have some right breast pain but this has resolved, she has not palpated any new masses in either breast and denies any nipple discharge or retraction.\par \par Her most recent imaging is as follows:\par B/L Breast Sono - 2021:\par Right breast:\par -At 1:00 N5 there is redemonstration of stable oval circumscribed mass measuring 0.6 x 0.8 x 0.4 cm. Short interval follow-up recommended. This is been stable for 18 months, supporting a probably benign etiology.\par Left breast:\par At 10:00 N5 there is redemonstration of a stable oval circumscribed mass measuring 0.9 x 0.8 x 0.4 cm. Short interval follow-up recommended. This is been stable for 6 months, supporting a probably benign etiology.\par BI-RADS category 3: Probably Benign\par \par INTERVAL HISTORY 3/28/22\par Estela is a 56 F with a BIRADS 3 abnormality now deemed BIRADS2\par She has no breast related complaints at this time.  She did have some right breast pain but this has resolved, she has not palpated any new masses in either breast and denies any nipple discharge or retraction.\par \par Her imaging is as follows:\par 2022 b/l dx mammo and US\par -breasts are heterogeneously dense\par B/L US:\par Again identified are stable probably benign oval circumscribed hypoechoic masses in both breasts and are as follows:\par \par RIGHT BREAST:\par -@1N5 measuring 0.9 cm x 0.6 cm x 0.3 cm.\par -previously biopsied mass at the 9-10 o'clock location 4 cm from the nipple which is without change measuring 0.9 cm x 1.0 cm x 0.4 cm.\par \par LEFT BREAST:\par -@10N 5 measuring 0.8 cm x 0.8 cm x 0.4 cm.\par BIRADS2 (stability since 2020 )\par

## 2022-03-28 NOTE — ASSESSMENT
[FreeTextEntry1] : Estela is a 56 F with a  BIRADS 3 abnormality now deemed BIRADS 2\par \par There was no suspicious abnormalities palpated within either breast on exam today. \par \par Her imaging is as follows:\par 03/14/2022 b/l dx mammo and US\par -breasts are heterogeneously dense\par B/L US:\par Again identified are stable probably benign oval circumscribed hypoechoic masses in both breasts and are as follows:\par \par RIGHT BREAST:\par -@1N5 measuring 0.9 cm x 0.6 cm x 0.3 cm.\par -previously biopsied mass at the 9-10 o'clock location 4 cm from the nipple which is without change measuring 0.9 cm x 1.0 cm x 0.4 cm.\par \par LEFT BREAST:\par -@10N 5 measuring 0.8 cm x 0.8 cm x 0.4 cm.\par BIRADS2 (stability since March 2020 )\par \par \par She will be due for a b/l  mammogram and US on 3/15/2023.  This will be scheduled for her today.  I will have her follow up after.\par She also would like to see plastic surgeon for possible breast reduction, she was given contact for referral \par AS REVIEW:\par We discussed BIRADS 3 lesions.  These lesions have a 2% chance of harboring malignancy.  There is always an option to obtain a tissue sample with a biopsy to confirm the diagnosis.   The procedure was described in detail including the placement of a tissue marker clip.  The risks of the procedure, including but not limited to bleeding and infection were also explained.  She is not interested in biopsy at this time and agrees to continue with short-term interval imaging.\par \par We discussed dense breasts.  Increasing breast density has been found to increase ones risk of breast cancer, but at this time, there is no clear indication for additional imaging in this setting, as both US and MRI have not been found to improve survival.  One can consider bilateral screening US.  However, out of 1000 women screened, the use of routine US will only identify an additional 3-5 cancers.  The use of US was found to increase the likelihood of undergoing more imaging and more biopsies.  She does have dense breasts.  We have decided to proceed with screening bilateral breast US at this time.  This will be scheduled with her next screening mammogram.\par \par She is otherwise at an average risk for breast cancer and should continue with annual screening mammograms and US. \par \par All of her questions were answered.  She knows to call with any further questions or concerns. \par \par PLAN: \par -b/l  mammogram and US on 3/15/2023\par -follow up after \par -plastic surgery referral for possible breast reduction

## 2022-07-13 ENCOUNTER — APPOINTMENT (OUTPATIENT)
Dept: ENDOCRINOLOGY | Facility: CLINIC | Age: 57
End: 2022-07-13

## 2022-07-13 ENCOUNTER — OUTPATIENT (OUTPATIENT)
Dept: OUTPATIENT SERVICES | Facility: HOSPITAL | Age: 57
LOS: 1 days | Discharge: HOME | End: 2022-07-13

## 2022-07-13 VITALS
TEMPERATURE: 97.8 F | DIASTOLIC BLOOD PRESSURE: 81 MMHG | WEIGHT: 185 LBS | BODY MASS INDEX: 29.73 KG/M2 | SYSTOLIC BLOOD PRESSURE: 142 MMHG | HEIGHT: 66 IN

## 2022-07-13 DIAGNOSIS — M85.80 OTHER SPECIFIED DISORDERS OF BONE DENSITY AND STRUCTURE, UNSPECIFIED SITE: ICD-10-CM

## 2022-07-13 DIAGNOSIS — E55.9 VITAMIN D DEFICIENCY, UNSPECIFIED: ICD-10-CM

## 2022-07-13 NOTE — DATA REVIEWED
[FreeTextEntry1] : 3/2021 high calcium and PTh 25 vit D 15 \par 2/2022: glucose 114 A1c 6%  calcium 11.2 TSH 1.27  Ft4 1.1  Alk phos 116 25 vit D 29 + LEANNA\par \par \par DXA scan ( 2021) osteopenia ( in PACS )

## 2022-07-13 NOTE — ASSESSMENT
[FreeTextEntry1] : # Diabetes work up - new onset \par - HbA1C - 6.0 (Feb 2022)\par - FBS - 114 (Feb 2022)\par - Repeat complete diabetic blood workup\par - diabetes education \par - strict diet and exercise regimen \par - advised weight loss \par - will follow up in clinic with repeat blood work\par \par #Dyslipidemia \par - elevated LDL - 124\par - TG- 224\par - Total Cholesterol - 208\par - HDl - 50  (Feb 2022)\par - Will follow up in 6 weeks with repeat values \par \par # Hyperparathyroid / hypercalcemia evaluation and Vit-D deficiency \par - Calcium - 11.4 (Feb 2022)\par - PTH - 105 (March 2021)\par - Vitamin D -  29 (Feb 2022)\par - Osteopenia on DXA scan \par - Patient advised to repeat blood work for primary hyperparathyroidism, 24hr urine calcium levels and Usg neck to look for parathyroid adenoma  \par \par \par

## 2022-07-13 NOTE — END OF VISIT
[] : Resident [FreeTextEntry3] : 57 year old female who present for evaluation of hypercalcemia, high PTH , vit d deficiency , preDM and DL \par # hypercalcemia with inappropriate high PTH \par - likely primary hyperparathyroidism , + kidney stones in the past and is on potassium citrate by urology  ,+ osteopenia on DXA scan \par - reviewed hypercalcemia symptoms and complications of hyperparathyroidism , will redo labs , 24 hour urine , neck US \par - depending on results will do sestamibi , hydration advised , she will continue vit D 800- 1000 IU daily \par \par #preDM DL: reviewed diet and exercise  [Time Spent: ___ minutes] : I have spent [unfilled] minutes of time on the encounter.

## 2022-07-13 NOTE — HISTORY OF PRESENT ILLNESS
[FreeTextEntry1] : Referred by primary care - Dr. Nathan Jean\par For diabetic workup, high cholesterol and high calcium levels  [de-identified] : 57 yr ms. Del Castillo, presented to the clinic for Diabetes and hypercalcemia workup.\par \par She complaints of nausea (?dyspepsia; on and off) and constipation (on and off) for 8 years .\par \par She had a long history of neck and lower back pain, Neck pain radiates to bilateral upper limbs (L>R): Lower back pain radiates to both the lower limbs. is on supplementation with Vit-D for deficiency.\par \par past history of renal calculi - calcium stone; ?secondary to hypercalcemia \par \par She denies fevers, polyuria, polyphagia or polydipsia.\par She is currently post menopausal for the past 8 years. \par \par Endoscopy and colonoscopy done this year - Normal - according to patient \par

## 2022-07-14 DIAGNOSIS — E83.52 HYPERCALCEMIA: ICD-10-CM

## 2022-07-14 DIAGNOSIS — E21.3 HYPERPARATHYROIDISM, UNSPECIFIED: ICD-10-CM

## 2022-07-14 DIAGNOSIS — M85.80 OTHER SPECIFIED DISORDERS OF BONE DENSITY AND STRUCTURE, UNSPECIFIED SITE: ICD-10-CM

## 2022-07-14 DIAGNOSIS — R73.03 PREDIABETES: ICD-10-CM

## 2022-07-14 DIAGNOSIS — E55.9 VITAMIN D DEFICIENCY, UNSPECIFIED: ICD-10-CM

## 2022-07-14 DIAGNOSIS — E11.9 TYPE 2 DIABETES MELLITUS WITHOUT COMPLICATIONS: ICD-10-CM

## 2022-07-14 DIAGNOSIS — E78.5 HYPERLIPIDEMIA, UNSPECIFIED: ICD-10-CM

## 2022-08-30 ENCOUNTER — RESULT REVIEW (OUTPATIENT)
Age: 57
End: 2022-08-30

## 2022-08-30 ENCOUNTER — OUTPATIENT (OUTPATIENT)
Dept: OUTPATIENT SERVICES | Facility: HOSPITAL | Age: 57
LOS: 1 days | Discharge: HOME | End: 2022-08-30

## 2022-08-30 DIAGNOSIS — E21.3 HYPERPARATHYROIDISM, UNSPECIFIED: ICD-10-CM

## 2022-08-30 DIAGNOSIS — E83.52 HYPERCALCEMIA: ICD-10-CM

## 2022-08-30 PROCEDURE — 76536 US EXAM OF HEAD AND NECK: CPT | Mod: 26

## 2022-08-31 ENCOUNTER — APPOINTMENT (OUTPATIENT)
Dept: PAIN MANAGEMENT | Facility: CLINIC | Age: 57
End: 2022-08-31

## 2022-08-31 VITALS
HEIGHT: 66 IN | HEART RATE: 70 BPM | DIASTOLIC BLOOD PRESSURE: 92 MMHG | WEIGHT: 185 LBS | BODY MASS INDEX: 29.73 KG/M2 | SYSTOLIC BLOOD PRESSURE: 150 MMHG

## 2022-08-31 DIAGNOSIS — Z82.49 FAMILY HISTORY OF ISCHEMIC HEART DISEASE AND OTHER DISEASES OF THE CIRCULATORY SYSTEM: ICD-10-CM

## 2022-08-31 DIAGNOSIS — Z83.511 FAMILY HISTORY OF GLAUCOMA: ICD-10-CM

## 2022-08-31 PROCEDURE — 99215 OFFICE O/P EST HI 40 MIN: CPT

## 2022-09-01 NOTE — ASSESSMENT
[FreeTextEntry1] : Ms. Del Castillo  is a 57-year-old woman who presents with a long history of both cervical and lumbar pain.  She has not been seen in the office since February 2021.  At this time she presents with worsening neck and back pain, neck pain mostly localized and lower back pain radiating down the right leg.  She has not had recent imaging therefore I will order updated imaging of the cervical and lumbar spine to delineate any spine pathology and start her on a course of physical therapy.  She will follow-up in 3-4 weeks for re-evaluation and to go over imaging results.  All of her questions were answered and she understood our conversation well.\par \par No medications were given at today's visit.\par \par No interventions ordered at this visit.\par \par Cervical MRI was ordered to delineate spine pathology such as disc displacement and stenosis.\par \par Lumbar MRI with and without contrast was ordered to delineate spine pathology such as disc displacement and stenosis.\par \par Physical therapy of the cervical spine 2-3 times a week for 4-8 weeks stressing a home exercise program of walking, shoulder girdle strengthening,  swimming, elliptical , recumbent bike, Raymundo chi and Yoga. Use things that heat like hot shower or icy heat before rehab, exercising and at the beginning of the day, and ice (ice in a bag never directly on the skin) after activity and at the end of the day.\par \par Physical therapy of the lumbar spine 2-3 times a week for 4-8 weeks stressing a home exercise program of walking, shoulder girdle strengthening,  swimming, elliptical , recumbent bike, Raymundo chi and Yoga. Use things that heat like hot shower or icy heat before rehab, exercising and at the beginning of the day, and ice (ice in a bag never directly on the skin) after activity and at the end of the day.\par \par Thank you for allowing me to assist in the management of this patient. \par \par \par  Best Regards, \par \par \par  Basia Underwood M.D., [facility]\par \par \par  Diplomate, American Board of Physical Medicine and Rehabilitation\par  Diplomate, American Board of Pain Medicine \par  Diplomate, American Board of Pain Management\par

## 2022-09-01 NOTE — DATA REVIEWED
[FreeTextEntry1] : MRI C spine 10/25/2019 Impression: Findings are without appreciable change when compared to November 2017. Mild spinal curvature and mild reversal of lordosis. Multilevel spondylosis. Central C3-4 focal ridge disc indents the spinal cord. Left central C4-5 small disc herniation mildly indents the spinal cord. C5-6 small right central ridge/disc. C6-7 shallow left central ridge disc. \par \par MRI L spine 10/2019- Impression: Mild-to-moderate multilevel spondylosis and multilevel facet arthropathy in the setting of a developmentally borderline spinal canal. L4-5 right central and foraminal shallow disc herniation also extending left central. L3-4 shallow left lateral and foraminal disc herniation. L1-2 right foraminal shallow disc herniation. \par Comparison with October 2017, the L4-5 shallow right and left central disc herniation is more pronounced, otherwise, the findings are stable.\par \par SOAPP (a Screener and Opioid Assessment for Patients with Pain) 8/31/2022. She scored a 0, which is low risk. \par \par Medications that trigger a UDS: Benzodiazepines (Ativan, Xanax, Valium) etc. Barbiturates, Narcotics (Avinza, Butrans, hydrocodone, Codeine, Tala, Methadone, Morphine, MS Contin, Opana, oxycodone, OxyContin, Suboxone, etc.) Pregabalin (Lyrica), Tramadol (Ultracet, Ultram, etc.) Tapentadol (Nucynta) and E-list Drugs (cocaine, THC, etc.) \par  \par Patient has combination of a low risk SOAPP and no risk factors. UDS would be repeated randomly every quarter. \par  \par Risk factors: Bipolar illness, positive for any illicit drugs, history of ETOH and drug abuse, any signs of diversion, sharing medications, selling medications. Non-consistent [place] drug reporting and above 120 mEq of morphine. \par   \par [facility] [place] REGISTRY: Consistent. \par  \par UDS: No data collected\par

## 2022-09-01 NOTE — ASSESSMENT
[FreeTextEntry1] : Ms. Del Castillo  is a 57-year-old woman who presents with a long history of both cervical and lumbar pain.  She has not been seen in the office since February 2021.  At this time she presents with worsening neck and back pain, neck pain mostly localized and lower back pain radiating down the right leg.  She has not had recent imaging therefore I will order updated imaging of the cervical and lumbar spine to delineate any spine pathology and start her on a course of physical therapy.  She will follow-up in 3-4 weeks for re-evaluation and to go over imaging results.  All of her questions were answered and she understood our conversation well.\par \par No medications were given at today's visit.\par \par No interventions ordered at this visit.\par \par Cervical MRI was ordered to delineate spine pathology such as disc displacement and stenosis.\par \par Lumbar MRI with and without contrast was ordered to delineate spine pathology such as disc displacement and stenosis.\par \par Physical therapy of the cervical spine 2-3 times a week for 4-8 weeks stressing a home exercise program of walking, shoulder girdle strengthening,  swimming, elliptical , recumbent bike, Raymundo chi and Yoga. Use things that heat like hot shower or icy heat before rehab, exercising and at the beginning of the day, and ice (ice in a bag never directly on the skin) after activity and at the end of the day.\par \par Physical therapy of the lumbar spine 2-3 times a week for 4-8 weeks stressing a home exercise program of walking, shoulder girdle strengthening,  swimming, elliptical , recumbent bike, Raymundo chi and Yoga. Use things that heat like hot shower or icy heat before rehab, exercising and at the beginning of the day, and ice (ice in a bag never directly on the skin) after activity and at the end of the day.\par \par Thank you for allowing me to assist in the management of this patient. \par \par \par  Best Regards, \par \par \par  Basia Underwood M.D., [facility]\par \par \par  Diplomate, American Board of Physical Medicine and Rehabilitation\par  Diplomate, American Board of Pain Medicine \par  Diplomate, American Board of Pain Management\par

## 2022-09-01 NOTE — DATA REVIEWED
[FreeTextEntry1] : MRI C spine 10/25/2019 Impression: Findings are without appreciable change when compared to November 2017. Mild spinal curvature and mild reversal of lordosis. Multilevel spondylosis. Central C3-4 focal ridge disc indents the spinal cord. Left central C4-5 small disc herniation mildly indents the spinal cord. C5-6 small right central ridge/disc. C6-7 shallow left central ridge disc. \par \par MRI L spine 10/2019- Impression: Mild-to-moderate multilevel spondylosis and multilevel facet arthropathy in the setting of a developmentally borderline spinal canal. L4-5 right central and foraminal shallow disc herniation also extending left central. L3-4 shallow left lateral and foraminal disc herniation. L1-2 right foraminal shallow disc herniation. \par Comparison with October 2017, the L4-5 shallow right and left central disc herniation is more pronounced, otherwise, the findings are stable.\par \par SOAPP (a Screener and Opioid Assessment for Patients with Pain) 8/31/2022. She scored a 0, which is low risk. \par \par Medications that trigger a UDS: Benzodiazepines (Ativan, Xanax, Valium) etc. Barbiturates, Narcotics (Avinza, Butrans, hydrocodone, Codeine, Tala, Methadone, Morphine, MS Contin, Opana, oxycodone, OxyContin, Suboxone, etc.) Pregabalin (Lyrica), Tramadol (Ultracet, Ultram, etc.) Tapentadol (Nucynta) and E-list Drugs (cocaine, THC, etc.) \par  \par Patient has combination of a low risk SOAPP and no risk factors. UDS would be repeated randomly every quarter. \par  \par Risk factors: Bipolar illness, positive for any illicit drugs, history of ETOH and drug abuse, any signs of diversion, sharing medications, selling medications. Non-consistent [place] drug reporting and above 120 mEq of morphine. \par   \par [facility] [place] REGISTRY: Consistent. \par  \par UDS: No data collected\par

## 2022-09-01 NOTE — REASON FOR VISIT
[Follow-Up Visit] : a follow-up pain management visit [FreeTextEntry2] : Pain in neck, lower back and shoulders.

## 2022-09-01 NOTE — DATA REVIEWED
[FreeTextEntry1] : MRI C spine 10/25/2019 Impression: Findings are without appreciable change when compared to November 2017. Mild spinal curvature and mild reversal of lordosis. Multilevel spondylosis. Central C3-4 focal ridge disc indents the spinal cord. Left central C4-5 small disc herniation mildly indents the spinal cord. C5-6 small right central ridge/disc. C6-7 shallow left central ridge disc. \par \par MRI L spine 10/2019- Impression: Mild-to-moderate multilevel spondylosis and multilevel facet arthropathy in the setting of a developmentally borderline spinal canal. L4-5 right central and foraminal shallow disc herniation also extending left central. L3-4 shallow left lateral and foraminal disc herniation. L1-2 right foraminal shallow disc herniation. \par Comparison with October 2017, the L4-5 shallow right and left central disc herniation is more pronounced, otherwise, the findings are stable.\par \par SOAPP (a Screener and Opioid Assessment for Patients with Pain) 8/31/2022. She scored a 0, which is low risk. \par \par Medications that trigger a UDS: Benzodiazepines (Ativan, Xanax, Valium) etc. Barbiturates, Narcotics (Avinza, Butrans, hydrocodone, Codeine, Tala, Methadone, Morphine, MS Contin, Opana, oxycodone, OxyContin, Suboxone, etc.) Pregabalin (Lyrica), Tramadol (Ultracet, Ultram, etc.) Tapentadol (Nucynta) and E-list Drugs (cocaine, THC, etc.) \par  \par Patient has combination of a low risk SOAPP and no risk factors. UDS would be repeated randomly every quarter. \par  \par Risk factors: Bipolar illness, positive for any illicit drugs, history of ETOH and drug abuse, any signs of diversion, sharing medications, selling medications. Non-consistent [place] drug reporting and above 120 mEq of morphine. \par   \par [facility] [place] REGISTRY: Consistent. \par  \par UDS: No data collected\par

## 2022-09-01 NOTE — PHYSICAL EXAM
[Normal Coordination] : normal coordination [Normal DTR UE/LE] : normal DTR UE/LE  [Normal Sensation] : normal sensation [Normal Mood and Affect] : normal mood and affect [Orientated] : orientated [Able to Communicate] : able to communicate [Well Developed] : well developed [Well Nourished] : well nourished [Flexion] : flexion [Extension] : extension [] : patient ambulates without assistive device

## 2022-09-01 NOTE — DATA REVIEWED
[FreeTextEntry1] : MRI C spine 10/25/2019 Impression: Findings are without appreciable change when compared to November 2017. Mild spinal curvature and mild reversal of lordosis. Multilevel spondylosis. Central C3-4 focal ridge disc indents the spinal cord. Left central C4-5 small disc herniation mildly indents the spinal cord. C5-6 small right central ridge/disc. C6-7 shallow left central ridge disc. \par \par MRI L spine 10/2019- Impression: Mild-to-moderate multilevel spondylosis and multilevel facet arthropathy in the setting of a developmentally borderline spinal canal. L4-5 right central and foraminal shallow disc herniation also extending left central. L3-4 shallow left lateral and foraminal disc herniation. L1-2 right foraminal shallow disc herniation. \par Comparison with October 2017, the L4-5 shallow right and left central disc herniation is more pronounced, otherwise, the findings are stable.\par \par SOAPP (a Screener and Opioid Assessment for Patients with Pain) 8/31/2022. She scored a 0, which is low risk. \par \par Medications that trigger a UDS: Benzodiazepines (Ativan, Xanax, Valium) etc. Barbiturates, Narcotics (Avinza, Butrans, hydrocodone, Codeine, Tala, Methadone, Morphine, MS Contin, Opana, oxycodone, OxyContin, Suboxone, etc.) Pregabalin (Lyrica), Tramadol (Ultracet, Ultram, etc.) Tapentadol (Nucynta) and E-list Drugs (cocaine, THC, etc.) \par  \par Patient has combination of a low risk SOAPP and no risk factors. UDS would be repeated randomly every quarter. \par  \par Risk factors: Bipolar illness, positive for any illicit drugs, history of ETOH and drug abuse, any signs of diversion, sharing medications, selling medications. Non-consistent [place] drug reporting and above 120 mEq of morphine. \par   \par [facility] [place] REGISTRY: Consistent. \par  \par UDS: No data collected\par

## 2022-09-01 NOTE — HISTORY OF PRESENT ILLNESS
[FreeTextEntry1] : ORIGINAL PRESENTATION:  Ms. Del Castillo is a 57-year-old woman who presents with a several year history of neck pain radiating in between the shoulder blades and into the left shoulder where she describes burning, stabbing, sharp pain around the left shoulder blade. She also complains of lower back pain, mostly right-sided, which radiates down the right leg to the foot with numbness and tingling. She states the pain is of undetermined origin, moderate to severe, it is nearly constant, in no typical pattern. She describes burning, shooting, sharp, pressure-like pain with numbness in the right leg. She denies any weakness in the upper or lower extremities.\par \par ACTIVITIES: Decreased pain with walking, exercise, relaxation. No change with lying down, standing, sitting, walking, exercise, coughing/sneezing, bowel movements. Can sit for 2 minutes, stand with no pain. She sometimes lies down due to pain. She does not use an assistive device. She is able to perform all AADL. \par \par PRIOR PAIN TREATMENTS: Nerve blocks and injections, specifically trigger point injects gave her no relief. Physical therapy, heat, cold, biofeedback gave her moderate relief. SOLA x 2, 50%relief. \par \par PRIOR PAIN MEDICATIONS: Tylenol, Vioxx, Duexis, Neurontin.\par \par \par TODAY: She presents for a follow up encounter. She has not been seen since 2/23/2021, at which time she underwent right C2-C5 cervical MBB with 100% immediate relief reported.  Prior to this she had undergone cervical MBB on 10/29/2020, which gave her many months of relief. She has also undergone cervical epidural steroid injections in 2019 and 2020, and right L4-5 SNRI's for her LBP, lumbar radiculopathy symptoms.\par \par Today she is complaining of worsening symptomatology regarding the cervical and lumbar spine.  With regards to the Neck she continues to have pain in the paracervical region, R > L. with regards to her lower back, she is complaining of pain that radiates mostly down the right leg with pain settling in the right knee.

## 2022-09-01 NOTE — ASSESSMENT
[FreeTextEntry1] : Ms. Del Castillo  is a 57-year-old woman who presents with a long history of both cervical and lumbar pain.  She has not been seen in the office since February 2021.  At this time she presents with worsening neck and back pain, neck pain mostly localized and lower back pain radiating down the right leg.  She has not had recent imaging therefore I will order updated imaging of the cervical and lumbar spine to delineate any spine pathology and start her on a course of physical therapy.  She will follow-up in 3-4 weeks for re-evaluation and to go over imaging results.  All of her questions were answered and she understood our conversation well.\par \par No medications were given at today's visit.\par \par No interventions ordered at this visit.\par \par Cervical MRI was ordered to delineate spine pathology such as disc displacement and stenosis.\par \par Lumbar MRI with and without contrast was ordered to delineate spine pathology such as disc displacement and stenosis.\par \par Physical therapy of the cervical spine 2-3 times a week for 4-8 weeks stressing a home exercise program of walking, shoulder girdle strengthening,  swimming, elliptical , recumbent bike, Raymundo chi and Yoga. Use things that heat like hot shower or icy heat before rehab, exercising and at the beginning of the day, and ice (ice in a bag never directly on the skin) after activity and at the end of the day.\par \par Physical therapy of the lumbar spine 2-3 times a week for 4-8 weeks stressing a home exercise program of walking, shoulder girdle strengthening,  swimming, elliptical , recumbent bike, Raymundo chi and Yoga. Use things that heat like hot shower or icy heat before rehab, exercising and at the beginning of the day, and ice (ice in a bag never directly on the skin) after activity and at the end of the day.\par \par Thank you for allowing me to assist in the management of this patient. \par \par \par  Best Regards, \par \par \par  Basia Underwood M.D., [facility]\par \par \par  Diplomate, American Board of Physical Medicine and Rehabilitation\par  Diplomate, American Board of Pain Medicine \par  Diplomate, American Board of Pain Management\par

## 2022-09-01 NOTE — ASSESSMENT
[FreeTextEntry1] : Ms. Del Castillo  is a 57-year-old woman who presents with a long history of both cervical and lumbar pain.  She has not been seen in the office since February 2021.  At this time she presents with worsening neck and back pain, neck pain mostly localized and lower back pain radiating down the right leg.  She has not had recent imaging therefore I will order updated imaging of the cervical and lumbar spine to delineate any spine pathology and start her on a course of physical therapy.  She will follow-up in 3-4 weeks for re-evaluation and to go over imaging results.  All of her questions were answered and she understood our conversation well.\par \par No medications were given at today's visit.\par \par No interventions ordered at this visit.\par \par Cervical MRI was ordered to delineate spine pathology such as disc displacement and stenosis.\par \par Lumbar MRI with and without contrast was ordered to delineate spine pathology such as disc displacement and stenosis.\par \par Physical therapy of the cervical spine 2-3 times a week for 4-8 weeks stressing a home exercise program of walking, shoulder girdle strengthening,  swimming, elliptical , recumbent bike, Raymundo chi and Yoga. Use things that heat like hot shower or icy heat before rehab, exercising and at the beginning of the day, and ice (ice in a bag never directly on the skin) after activity and at the end of the day.\par \par Physical therapy of the lumbar spine 2-3 times a week for 4-8 weeks stressing a home exercise program of walking, shoulder girdle strengthening,  swimming, elliptical , recumbent bike, Raymundo chi and Yoga. Use things that heat like hot shower or icy heat before rehab, exercising and at the beginning of the day, and ice (ice in a bag never directly on the skin) after activity and at the end of the day.\par \par Thank you for allowing me to assist in the management of this patient. \par \par \par  Best Regards, \par \par \par  Basia Underwood M.D., [facility]\par \par \par  Diplomate, American Board of Physical Medicine and Rehabilitation\par  Diplomate, American Board of Pain Medicine \par  Diplomate, American Board of Pain Management\par

## 2022-09-14 ENCOUNTER — APPOINTMENT (OUTPATIENT)
Dept: MRI IMAGING | Facility: CLINIC | Age: 57
End: 2022-09-14

## 2022-09-14 PROCEDURE — 72148 MRI LUMBAR SPINE W/O DYE: CPT

## 2022-09-14 PROCEDURE — 72141 MRI NECK SPINE W/O DYE: CPT

## 2022-09-28 ENCOUNTER — APPOINTMENT (OUTPATIENT)
Dept: PAIN MANAGEMENT | Facility: CLINIC | Age: 57
End: 2022-09-28

## 2022-09-28 VITALS — DIASTOLIC BLOOD PRESSURE: 80 MMHG | SYSTOLIC BLOOD PRESSURE: 150 MMHG | HEART RATE: 80 BPM

## 2022-09-28 PROCEDURE — 99213 OFFICE O/P EST LOW 20 MIN: CPT

## 2022-09-28 NOTE — PHYSICAL EXAM
[Normal Coordination] : normal coordination [Normal DTR UE/LE] : normal DTR UE/LE  [Normal Sensation] : normal sensation [Normal Mood and Affect] : normal mood and affect [Orientated] : orientated [Able to Communicate] : able to communicate [Well Developed] : well developed [Well Nourished] : well nourished [Flexion] : flexion [Extension] : extension [] : no swelling

## 2022-09-28 NOTE — DATA REVIEWED
[FreeTextEntry1] : MRI of the cervical spine dated September 14, 2022 shows mild  dextroscoliosis with mild rotary component and mild straightening of sagittal lordosis.  multilevel disc disease without spinal stenosis.  C3-4  central ridge disc focally indenting the spinal cord.  Bilateral foraminal osteophytes.  C4-5 central left central ridge disc.  Bilateral foraminal narrowing.  Bilateral root sleeve cyst.  C5-6 shallow left foraminal ridge  disc compresses the C6 root.  Right central small ridge disc.  Bilateral foraminal narrowing.  Bilateral small root sleeve cysts.  C6-7 6 shallow left central left foraminal protrusion disc impinges on the C7 root. \par \par  MRI of the lumbar spine dated September 14, 2002 shows  slight straightening of sagittal lordosis.  Multilevel spondylosis and lower lumbar facet arthrosis.  L5-1 left more than right mild foraminal stenosis.  L4-5 severe spinal stenosis central disc herniation extending to the right more than the left.  Bilateral lateral recess stenosis and bilateral foraminal stenosis.  L3-4 shallow left foraminal disc herniation.  L2-3 small left foraminal disc herniation. \par \par MRI C spine 10/25/2019 Impression: Findings are without appreciable change when compared to November 2017. Mild spinal curvature and mild reversal of lordosis. Multilevel spondylosis. Central C3-4 focal ridge disc indents the spinal cord. Left central C4-5 small disc herniation mildly indents the spinal cord. C5-6 small right central ridge/disc. C6-7 shallow left central ridge disc. \par \par MRI L spine 10/2019- Impression: Mild-to-moderate multilevel spondylosis and multilevel facet arthropathy in the setting of a developmentally borderline spinal canal. L4-5 right central and foraminal shallow disc herniation also extending left central. L3-4 shallow left lateral and foraminal disc herniation. L1-2 right foraminal shallow disc herniation. \par Comparison with October 2017, the L4-5 shallow right and left central disc herniation is more pronounced, otherwise, the findings are stable.\par \par SOAPP (a Screener and Opioid Assessment for Patients with Pain) 8/31/2022. She scored a 0, which is low risk. \par \par Medications that trigger a UDS: Benzodiazepines (Ativan, Xanax, Valium) etc. Barbiturates, Narcotics (Avinza, Butrans, hydrocodone, Codeine, Tala, Methadone, Morphine, MS Contin, Opana, oxycodone, OxyContin, Suboxone, etc.) Pregabalin (Lyrica), Tramadol (Ultracet, Ultram, etc.) Tapentadol (Nucynta) and E-list Drugs (cocaine, THC, etc.) \par  \par Patient has combination of a low risk SOAPP and no risk factors. UDS would be repeated randomly every quarter. \par  \par Risk factors: Bipolar illness, positive for any illicit drugs, history of ETOH and drug abuse, any signs of diversion, sharing medications, selling medications. Non-consistent [place] drug [place] and above 120 mEq of morphine. \par   \par [place] REGISTRY: Consistent. \par  \par UDS: No data collected\par

## 2022-09-28 NOTE — HISTORY OF PRESENT ILLNESS
[FreeTextEntry1] : ORIGINAL PRESENTATION:  Ms. Del Castillo is a 57-year-old woman who presents with a several year history of neck pain radiating in between the shoulder blades and into the left shoulder where she describes burning, stabbing, sharp pain around the left shoulder blade. She also complains of lower back pain, mostly right-sided, which radiates down the right leg to the foot with numbness and tingling. She states the pain is of undetermined origin, moderate to severe, it is nearly constant, in no typical pattern. She describes burning, shooting, sharp, pressure-like pain with numbness in the right leg. She denies any weakness in the upper or lower extremities.\par \par ACTIVITIES: Decreased pain with walking, exercise, relaxation. No change with lying down, standing, sitting, walking, exercise, coughing/sneezing, bowel movements. Can sit for 2 minutes, stand with no pain. She sometimes lies down due to pain. She does not use an assistive device. She is able to perform all AADL. \par \par PRIOR PAIN TREATMENTS: Nerve blocks and injections, specifically trigger point injects gave her no relief. Physical therapy, heat, cold, biofeedback gave her moderate relief. SOLA x 2, 50%relief. \par \par PRIOR PAIN MEDICATIONS: Tylenol, Vioxx, Duexis, Neurontin.\par \par \par PATIENT PRESENTS FOR FOLLOW UP: She just started trialing PT this week. The MRI of the cervical   And lumbar Spine was reviewed with the patient and documented below. She is having pain radiating from her back down her right leg. She has spinal stenosis which is more severe on the right. She is interested in continuing with PT at this time and will consider injections.  \par \par

## 2022-09-28 NOTE — ASSESSMENT
[FreeTextEntry1] : This is a 57-year-old woman who presents with a several year history of neck pain radiating in between the shoulder blades and into the left shoulder where she describes burning, stabbing, sharp pain around the left shoulder blade. She will continue with her PT at this time. We will follow up in 4 weeks to reevaluate and discuss injections further.\par \par All this patients questions were answered and the conversation was understood well.\par \par I, Misti Duncan, attest that this documentation has been prepared under the direction and in the presence of Provider Basia Underwood MD.\par \par \par Thank you for allowing me to assist in the management of this patient. \par \par \par Best Regards, \par \par \par Basia Underwood M.D., FAAPMR\par \par \par Diplomate, American Board of Physical Medicine and Rehabilitation\par Diplomate, American Board of Pain Medicine \par Diplomate, American Board of Pain Management\par

## 2022-09-29 ENCOUNTER — OUTPATIENT (OUTPATIENT)
Dept: OUTPATIENT SERVICES | Facility: HOSPITAL | Age: 57
LOS: 1 days | Discharge: HOME | End: 2022-09-29

## 2022-09-29 ENCOUNTER — APPOINTMENT (OUTPATIENT)
Dept: ENDOCRINOLOGY | Facility: CLINIC | Age: 57
End: 2022-09-29

## 2022-09-29 VITALS
DIASTOLIC BLOOD PRESSURE: 87 MMHG | WEIGHT: 185 LBS | HEART RATE: 75 BPM | SYSTOLIC BLOOD PRESSURE: 132 MMHG | BODY MASS INDEX: 29.73 KG/M2 | HEIGHT: 66 IN

## 2022-09-29 DIAGNOSIS — R73.03 PREDIABETES.: ICD-10-CM

## 2022-09-29 DIAGNOSIS — E83.52 HYPERCALCEMIA: ICD-10-CM

## 2022-09-29 DIAGNOSIS — E21.3 HYPERPARATHYROIDISM, UNSPECIFIED: ICD-10-CM

## 2022-09-29 DIAGNOSIS — E21.0 PRIMARY HYPERPARATHYROIDISM: ICD-10-CM

## 2022-09-29 DIAGNOSIS — E78.5 HYPERLIPIDEMIA, UNSPECIFIED: ICD-10-CM

## 2022-09-29 NOTE — ASSESSMENT
[FreeTextEntry1] : # Pre-Diabetes\par - HbA1C - 6.0 (Feb 2022) --> 5.7( Aug 2022)\par - strict diet and exercise regimen \par - advised weight loss \par \par # Dyslipidemia \par - Lipid panel (Aug 2022) - T.chol. 176, , ; \par - Continue diet and exercise\par \par # Hyperparathyroidism / hypercalcemia, Vit-D deficiency \par - Aug 2022 - Ca 11.2, ALP - 120, iPTH - 209, iCa - 5.9; 24 hr urine Ca - 290; FeCa - 18\par - Vit D 25 OH - 26\par - Can take vitamin D 1000IU OD\par - US thyroid (Sep 2022) - No parathyroid lesion; single subcentimeter thyroid nodule Rt midpole 0.4mm\par - Adequate hydration advised\par - Sestamibi scan ordered\par - ENT referral for evaluation for parathyroidectomy given h/o renal stones\par \par \par f/u in 6months\par \par

## 2022-09-29 NOTE — REVIEW OF SYSTEMS
[Fever] : no fever [Fatigue] : fatigue [Chest Pain] : no chest pain [Palpitations] : no palpitations [Orthopnea] : no orthopnea [Shortness Of Breath] : no shortness of breath [Dyspnea on Exertion] : no dyspnea on exertion [Constipation] : no constipation [Diarrhea] : diarrhea [Dysuria] : no dysuria [Nocturia] : no nocturia [Headache] : no headache [Back Pain] : back pain [Polydipsia] : polydipsia [Hot Flashes] : hot flashes [Negative] : Neurological [de-identified] : S [de-identified] : Sweating

## 2022-09-29 NOTE — ADDENDUM
[FreeTextEntry1] : 56 yo F with PMH of pre-DM, Hypercalcemia presented for follow up visit.\par \par #Hypercalecmia\par - PTH mediated, 24 hour urine calcium 290 \par - h/o renal stones in the past, osteopenia on DXA scan in 2021\par - Discussed pathophysiology of primary hyperparathyroidism, and treatment options, explained that although there are medical options curative treatment is surgery \par - US neck did not show a parathyroid adenoma\par - Will get a sestamibi scan and schedule followup with ENT for parathyroidectomy \par - Ok to take 1000 international units of Vitamin D daily

## 2022-09-29 NOTE — HISTORY OF PRESENT ILLNESS
[FreeTextEntry1] : 58 yo F with PMH of pre-DM, Hypercalcemia presented for follow up visit. Currently denies any symptoms except for back and neck pain for which she was seen by orthopedics yesterday and is scheduled to obtain MRI of spine. \par She has on/off nausea, constipation but currently it's resolved. \par past history of renal calculi - calcium stone; ?secondary to hypercalcemia - no surgery\par She denies fevers, polyuria, polyphagia or polydipsia.\par She is currently post menopausal for the past 8 years. \par Endoscopy and colonoscopy done this year - Normal - according to patient \par \par She had Labs done in 08/2022: \par Lipid panel - T.cho. 176, , ; \par Ca 11.2, ALP - 120, iPTH - 209, iCa - 5.9\par 24 hr urine Ca - 290; Ca/Cr ratio - 234\par Vit D 25 OH - 26; A1c - 5.7\par US thyroid (Sep 2022) - No parathyroid lesion; single subcentimeter thyroid nodule Rt midpole 0.4mm\par

## 2022-11-09 ENCOUNTER — APPOINTMENT (OUTPATIENT)
Dept: PAIN MANAGEMENT | Facility: CLINIC | Age: 57
End: 2022-11-09

## 2022-11-10 ENCOUNTER — APPOINTMENT (OUTPATIENT)
Dept: PAIN MANAGEMENT | Facility: CLINIC | Age: 57
End: 2022-11-10

## 2022-11-11 ENCOUNTER — NON-APPOINTMENT (OUTPATIENT)
Age: 57
End: 2022-11-11

## 2022-12-07 ENCOUNTER — APPOINTMENT (OUTPATIENT)
Dept: PAIN MANAGEMENT | Facility: CLINIC | Age: 57
End: 2022-12-07

## 2022-12-07 VITALS
HEIGHT: 66 IN | BODY MASS INDEX: 29.73 KG/M2 | SYSTOLIC BLOOD PRESSURE: 138 MMHG | HEART RATE: 69 BPM | WEIGHT: 185 LBS | DIASTOLIC BLOOD PRESSURE: 62 MMHG

## 2022-12-07 PROCEDURE — 99214 OFFICE O/P EST MOD 30 MIN: CPT

## 2022-12-07 NOTE — HISTORY OF PRESENT ILLNESS
[FreeTextEntry1] : ORIGINAL PRESENTATION:  Ms. Del Castillo is a 57-year-old woman who presents with a several year history of neck pain radiating in between the shoulder blades and into the left shoulder where she describes burning, stabbing, sharp pain around the left shoulder blade. She also complains of lower back pain, mostly right-sided, which radiates down the right leg to the foot with numbness and tingling. She states the pain is of undetermined origin, moderate to severe, it is nearly constant, in no typical pattern. She describes burning, shooting, sharp, pressure-like pain with numbness in the right leg. She denies any weakness in the upper or lower extremities.\par \par ACTIVITIES: Decreased pain with walking, exercise, relaxation. No change with lying down, standing, sitting, walking, exercise, coughing/sneezing, bowel movements. Can sit for 2 minutes, stand with no pain. She sometimes lies down due to pain. She does not use an assistive device. She is able to perform all AADL. \par \par PRIOR PAIN TREATMENTS: Nerve blocks and injections, specifically trigger point injects gave her no relief. Physical therapy, heat, cold, biofeedback gave her moderate relief. SOLA x 2, 50%relief. \par \par PRIOR PAIN MEDICATIONS: Tylenol, Vioxx, Duexis, Neurontin.\par \par \par TODAY: I had the pleasure of seeing Ms. Del Castillo today in follow up.  Her previous history and physical findings have been reviewed.\par \par She is under our care for chronic cervical and lumbar pain with radiculopathy which she is receiving continuing active treatment for.  She continues to attend PT but is finishing up this week.  She has been considering injections and wishes to discuss having it done for her lumbar spine.  She states the pain is localized to the R side of her lumbar spine radiating down into her leg into her knee and shin.  She rates the pain currently a 6/10 but states it can go as high as an 8/10.  She has difficulty with laying flat and ambulating due to the pain and we will eavluate her further.\par

## 2022-12-07 NOTE — ASSESSMENT
[FreeTextEntry1] : This is a 57-year-old female with chronic cervical and lumbar pain.  She will finish out PT and we will request authorization for R L3-L5 SNRI with MAC and f/u once completed.\par \par I personally reviewed with the PA, this patient's history and physical exam findings, as documented above. I have discussed the relevant areas of concern, having direct implications to the presenting problems and illnesses, and I have personally examined all pertinent and positive and negative findings, which impact on the prior pain management treatment. \par \par \par The patient has severe anxiety of procedures that necessitates monitored anesthesia care (MAC). The procedure performed will be close to major nerves, arteries, and spinal cord and/or joint structures. Due to the proximity of these structures, we need the patient to be still during the procedure. With the help of MAC, this will be safely achieved and decrease the risk of any complications.\par \par Carlyn Gonzalez, MS, PA-C\par Basia Underwood MD\par

## 2022-12-08 ENCOUNTER — APPOINTMENT (OUTPATIENT)
Dept: PAIN MANAGEMENT | Facility: CLINIC | Age: 57
End: 2022-12-08

## 2023-01-19 ENCOUNTER — APPOINTMENT (OUTPATIENT)
Dept: PAIN MANAGEMENT | Facility: CLINIC | Age: 58
End: 2023-01-19
Payer: COMMERCIAL

## 2023-01-19 PROCEDURE — 99152Z: CUSTOM

## 2023-01-19 PROCEDURE — 64484 NJX AA&/STRD TFRM EPI L/S EA: CPT | Mod: RT

## 2023-01-19 PROCEDURE — 93770 DETERMINATION VENOUS PRESS: CPT | Mod: 59

## 2023-01-19 PROCEDURE — 64483 NJX AA&/STRD TFRM EPI L/S 1: CPT | Mod: RT

## 2023-01-19 PROCEDURE — 94761 N-INVAS EAR/PLS OXIMETRY MLT: CPT | Mod: 59

## 2023-01-19 NOTE — PROCEDURE
[FreeTextEntry3] : \par SELECTIVE TRANSFORAMINAL LUMBAR EPIDURAL NERVE ROOT INJECTION UNDER FLUOROSCOPY\par \par  \par \par Date:  2023\par \par Patient: Estela Del Castillo\par \par :  1965\par \par \par \par Preoperative Diagnosis: Right  L4 radiculitis; Right  L3 radiculitis\par \par Procedure: Selective Right L3-4; L4-5 Transforaminal Lumbar Epidural Nerve Root Injection under Fluoroscopy\par \par Physician: Basia Underwood MD FAAPMR\par \par \par \par Anesthesia: See nurses notes/MAC/Cold spray Versed 2 mg/fentanyl 100 mcg IVP\par \par \par \par Medical Necessity:  Failure of conservative management.\par \par \par \par Indication for Fluoroscopy:  This procedure requires the precise placement of the spinal needle into the specific paravertebral foramen.  It is the only way to accurately and safely perform the injection.\par \par \par \par CONSENT: The possible complications including infection, bleeding, nerve damage, Hospital admission, death or failure of the procedure; though unusual, are theoretically possible. The patient was educated about the of the procedure and alternative therapies. All questions were answered and the patient freely gave consent to proceed.\par \par \par \par Monitoring:  Patient had continuous blood pressure, EKG, and pulse oximetry throughout the case. See nurse's notes.\par \par  \par \par PROCEDURE NOTE:\par \par After obtaining written consent, the patient was placed on the fluoroscopic table in the prone position with the pillow placed under the hips. The lumbar area was prepped with betadine solution and draped in the usual manner. A time out was performed.  Cold spray was used to localize the area. The fluoroscope was used to identify the L2///L3///L4 vertebral body on the AP projection. It was then rotated into an oblique projection until the superior articulating process of the L4 (inferior) vertebra is projected beneath the 6 o'clock position of the L3 (superior) vertebrae. The 22 gauge 3.5inch needle was inserted in the skin at a point overlying the superior articulating process of the inferior vertebra and aimed for the 6 o'clock position of the superior vertebrae's pedicle.  After the needle contacted bone, a lateral projection was obtained to insure that the needle tip was in proximity with the vertebral body. Paresthesias were not noted.  One ml of Omnipaque 240 was injected and a neurogram was obtained. Following demonstration of the neurogram, 1 ml of Preservative free normal saline and 1 ml of depomedrol (40mg) was injected. The small volume and relatively high concentration was chosen to preserve selectivity and diagnostic value of the injection. There was no CSF or heme identified.  The L4-5 level was injected in identical fashion. There were no signs of, intravascular block or hypotension. The needle was removed intact. A band aid was place on the site.\par \par  \par \par Epidurogram: The nerve root was observed in its outline on the neurogram. Distal and proximal spread was noted pointing away from epidural fibrosis/scarring.\par \par Complications: None. The patient tolerated the procedure well. \par \par  \par \par Disposition: I have examined the patient and there are no new physical findings since the original presentation.  Sensory and motor function were intact. The patient met discharge criteria see nurses notes. The discharge instruction sheet was reviewed and given to the patient. The patient was discharged home with a .\par \par \par \par If patient gets sustained relief will have patient do modified planks 3 times a day on carpet or yoga mat starting at 5 seconds building up to 1 minute without grimacing/Valsalva and walking. \par \par  \par \par Comment: 1st SNRI today, depending on effectiveness would schedule 2nd SNRI in 1-2 weeks vs caudal epidural steroid vs follow up in office depending on insurances. Follow up office. Call if any problems\par \par \par \par  \par \par This document was electronically signed by: \par \par Basia Underwood MD, FAAPMR\par Diplomate, American Board of Physical Medicine and Rehabilitation\par Diplomate, American Board of Pain Medicine\par \par

## 2023-02-09 ENCOUNTER — APPOINTMENT (OUTPATIENT)
Dept: PAIN MANAGEMENT | Facility: CLINIC | Age: 58
End: 2023-02-09
Payer: COMMERCIAL

## 2023-02-09 PROCEDURE — 99152Z: CUSTOM

## 2023-02-09 PROCEDURE — 93770 DETERMINATION VENOUS PRESS: CPT

## 2023-02-09 PROCEDURE — 64484 NJX AA&/STRD TFRM EPI L/S EA: CPT | Mod: RT

## 2023-02-09 PROCEDURE — 64483 NJX AA&/STRD TFRM EPI L/S 1: CPT | Mod: RT

## 2023-02-09 NOTE — PROCEDURE
[FreeTextEntry3] : \par SELECTIVE TRANSFORAMINAL LUMBAR EPIDURAL NERVE ROOT INJECTION UNDER FLUOROSCOPY\par \par  \par \par Date:  2023\par \par Patient: Estela Del Castillo\par \par :  1965\par \par \par \par Preoperative Diagnosis: Right  L4 radiculitis; Right  L3 radiculitis\par \par Procedure: Selective Right L3-4; L4-5 Transforaminal Lumbar Epidural Nerve Root Injection under Fluoroscopy\par \par Physician: Basia Underwood MD FAAPMR\par \par \par \par Anesthesia: See nurses notes/MAC/Cold spray Versed 2 mg/fentanyl 100 mcg IVP\par \par \par \par Medical Necessity:  Failure of conservative management.\par \par \par \par Indication for Fluoroscopy:  This procedure requires the precise placement of the spinal needle into the specific paravertebral foramen.  It is the only way to accurately and safely perform the injection.\par \par \par \par CONSENT: The possible complications including infection, bleeding, nerve damage, Hospital admission, death or failure of the procedure; though unusual, are theoretically possible. The patient was educated about the of the procedure and alternative therapies. All questions were answered and the patient freely gave consent to proceed.\par \par \par \par Monitoring:  Patient had continuous blood pressure, EKG, and pulse oximetry throughout the case. See nurse's notes.\par \par  \par \par PROCEDURE NOTE:\par \par After obtaining written consent, the patient was placed on the fluoroscopic table in the prone position with the pillow placed under the hips. The lumbar area was prepped with betadine solution and draped in the usual manner. A time out was performed.  Cold spray was used to localize the area. The fluoroscope was used to identify the L2///L3///L4 vertebral body on the AP projection. It was then rotated into an oblique projection until the superior articulating process of the L4 (inferior) vertebra is projected beneath the 6 o'clock position of the L3 (superior) vertebrae. The 22 gauge 3.5inch needle was inserted in the skin at a point overlying the superior articulating process of the inferior vertebra and aimed for the 6 o'clock position of the superior vertebrae's pedicle.  After the needle contacted bone, a lateral projection was obtained to insure that the needle tip was in proximity with the vertebral body. Paresthesias were not noted.  One ml of Omnipaque 240 was injected and a neurogram was obtained. Following demonstration of the neurogram, 1 ml of Preservative free normal saline and 1 ml of depomedrol (40mg) was injected. The small volume and relatively high concentration was chosen to preserve selectivity and diagnostic value of the injection. There was no CSF or heme identified.  The L4-5 level was injected in identical fashion. There were no signs of, intravascular block or hypotension. The needle was removed intact. A band aid was place on the site.\par \par  \par \par Epidurogram: The nerve root was observed in its outline on the neurogram. Distal and proximal spread was noted pointing away from epidural fibrosis/scarring.\par \par Complications: None. The patient tolerated the procedure well. \par \par  \par \par Disposition: I have examined the patient and there are no new physical findings since the original presentation.  Sensory and motor function were intact. The patient met discharge criteria see nurses notes. The discharge instruction sheet was reviewed and given to the patient. The patient was discharged home with a .\par \par \par \par If patient gets sustained relief will have patient do modified planks 3 times a day on carpet or yoga mat starting at 5 seconds building up to 1 minute without grimacing/Valsalva and walking. \par \par  \par \par Comment: 1st SNRI pt had 30% relkief, 2nd SNRI is today, depending on effectiveness would schedule 3rd SNRI in 1-2 weeks vs caudal epidural steroid vs follow up in office depending on insurances. Follow up office. Call if any problems.\par \par \par \par  \par \par This document was electronically signed by: \par \par Basia Underwood MD, FAAPMR\par Diplomate, American Board of Physical Medicine and Rehabilitation\par Diplomate, American Board of Pain Medicine\par \par

## 2023-02-15 ENCOUNTER — NON-APPOINTMENT (OUTPATIENT)
Age: 58
End: 2023-02-15

## 2023-02-23 ENCOUNTER — APPOINTMENT (OUTPATIENT)
Dept: PAIN MANAGEMENT | Facility: CLINIC | Age: 58
End: 2023-02-23
Payer: COMMERCIAL

## 2023-02-23 PROCEDURE — 64484 NJX AA&/STRD TFRM EPI L/S EA: CPT | Mod: RT

## 2023-02-23 PROCEDURE — 64483 NJX AA&/STRD TFRM EPI L/S 1: CPT | Mod: RT

## 2023-02-23 PROCEDURE — 99152Z: CUSTOM

## 2023-02-23 PROCEDURE — 93040 RHYTHM ECG WITH REPORT: CPT | Mod: 59,79

## 2023-02-23 PROCEDURE — 93770 DETERMINATION VENOUS PRESS: CPT | Mod: 59

## 2023-02-23 NOTE — PROCEDURE
[FreeTextEntry3] : \par SELECTIVE TRANSFORAMINAL LUMBAR EPIDURAL NERVE ROOT INJECTION UNDER FLUOROSCOPY\par \par  \par \par Date:  2023\par \par Patient: Estela Del Castillo\par \par :  1965\par \par \par \par Preoperative Diagnosis: Right  L4 radiculitis; Right  L3 radiculitis\par \par Procedure: Selective Right L3-4; L4-5 Transforaminal Lumbar Epidural Nerve Root Injection under Fluoroscopy\par \par Physician: Basia Underwood MD FAAPMR\par \par \par \par Anesthesia: See nurses notes/MAC/Cold spray Versed 2 mg/fentanyl 100 mcg IVP\par \par \par \par Medical Necessity:  Failure of conservative management.\par \par \par \par Indication for Fluoroscopy:  This procedure requires the precise placement of the spinal needle into the specific paravertebral foramen.  It is the only way to accurately and safely perform the injection.\par \par \par \par CONSENT: The possible complications including infection, bleeding, nerve damage, Hospital admission, death or failure of the procedure; though unusual, are theoretically possible. The patient was educated about the of the procedure and alternative therapies. All questions were answered and the patient freely gave consent to proceed.\par \par \par \par Monitoring:  Patient had continuous blood pressure, EKG, and pulse oximetry throughout the case. See nurse's notes.\par \par  \par \par PROCEDURE NOTE:\par \par After obtaining written consent, the patient was placed on the fluoroscopic table in the prone position with the pillow placed under the hips. The lumbar area was prepped with betadine solution and draped in the usual manner. A time out was performed.  Cold spray was used to localize the area. The fluoroscope was used to identify the L2///L3///L4 vertebral body on the AP projection. It was then rotated into an oblique projection until the superior articulating process of the L4 (inferior) vertebra is projected beneath the 6 o'clock position of the L3 (superior) vertebrae. The 22 gauge 3.5inch needle was inserted in the skin at a point overlying the superior articulating process of the inferior vertebra and aimed for the 6 o'clock position of the superior vertebrae's pedicle.  After the needle contacted bone, a lateral projection was obtained to insure that the needle tip was in proximity with the vertebral body. Paresthesias were not noted.  One ml of Omnipaque 240 was injected and a neurogram was obtained. Following demonstration of the neurogram, 1 ml of Preservative free normal saline and 1 ml of depomedrol (40mg) was injected. The small volume and relatively high concentration was chosen to preserve selectivity and diagnostic value of the injection. There was no CSF or heme identified.  The L4-5 level was injected in identical fashion. There were no signs of, intravascular block or hypotension. The needle was removed intact. A band aid was place on the site.\par \par  \par \par Epidurogram: The nerve root was observed in its outline on the neurogram. Distal and proximal spread was noted pointing away from epidural fibrosis/scarring.\par \par Complications: None. The patient tolerated the procedure well. \par \par  \par \par Disposition: I have examined the patient and there are no new physical findings since the original presentation.  Sensory and motor function were intact. The patient met discharge criteria see nurses notes. The discharge instruction sheet was reviewed and given to the patient. The patient was discharged home with a .\par \par \par \par If patient gets sustained relief will have patient do modified planks 3 times a day on carpet or yoga mat starting at 5 seconds building up to 1 minute without grimacing/Valsalva and walking. \par \par  \par \par Comment: 2nd SNRI pt had 50% relief, 3rd SNRI is today, schedule follow up in office. Follow up office. Call if any problems.\par \par \par \par  \par \par This document was electronically signed by: \par \par Basia Underwood MD, FAAPMR\par Diplomate, American Board of Physical Medicine and Rehabilitation\par Diplomate, American Board of Pain Medicine\par \par

## 2023-02-27 ENCOUNTER — APPOINTMENT (OUTPATIENT)
Dept: OTOLARYNGOLOGY | Facility: CLINIC | Age: 58
End: 2023-02-27
Payer: COMMERCIAL

## 2023-02-27 ENCOUNTER — NON-APPOINTMENT (OUTPATIENT)
Age: 58
End: 2023-02-27

## 2023-02-27 VITALS — BODY MASS INDEX: 31.66 KG/M2 | HEIGHT: 66 IN | WEIGHT: 197 LBS

## 2023-02-27 DIAGNOSIS — E83.52 HYPERCALCEMIA: ICD-10-CM

## 2023-02-27 DIAGNOSIS — E21.0 PRIMARY HYPERPARATHYROIDISM: ICD-10-CM

## 2023-02-27 PROCEDURE — 99204 OFFICE O/P NEW MOD 45 MIN: CPT

## 2023-02-27 NOTE — REASON FOR VISIT
[Initial Evaluation] : an initial evaluation for [FreeTextEntry2] : enlarged  thyroid ,  high calcium levels

## 2023-02-27 NOTE — DATA REVIEWED
[de-identified] : Test was reviewed by me.\par PROCEDURE DATE:  08/30/2022\par \par \par \par INTERPRETATION:  Clinical history: Abnormal bloodwork, evaluate for parathyroid adenoma.\par \par Technique: Thyroid sonogram.\par \par Comparison: None.\par \par Findings:\par \par Echotexture: Homogeneous.\par \par Thyroid size:\par \par Right lobe: 4.2 cm x 2.0 cm x  1.9 cm. Left lobe: 3.9 cm x  1.4 cm x 1.6 cm.\par Isthmus: 2 mm.\par \par Vascularity: Normal.\par \par Nodule number: 1\par Size: 0.4 x 0.4 x 0.4 cm; Location: right; mid pole.\par \par Composition: solid/almost completely solid (2)\par Echogenicity: hypoechoic (2)\par Shape: wider-than-tall (0)\par Margins: smooth (0)\par Echogenic foci: none (0)\par \par ACR TI-RADS risk category: TR4 (4-6 points)\par \par Other: No parathyroid lesions seen.\par \par Impression:\par \par Single subcentimeter thyroid nodule as above.\par \par No parathyroid lesions seen.\par

## 2023-02-27 NOTE — HISTORY OF PRESENT ILLNESS
[de-identified] : Patient presents today c/o enlarged  thyroid ,  high calcium levels . \par She had  ultrasound told to having  enlarged  thyroid  .  Occasionally  she does have difficulty with swallowing ,  doesn't happen often . She does have kidney stones cause by medication she was taking . Pt has  Ca 11.2. Pt has some fatigue. Pt has no other associated symptoms. Endo note and labs and ultrasound reviewed.

## 2023-03-23 ENCOUNTER — APPOINTMENT (OUTPATIENT)
Dept: PAIN MANAGEMENT | Facility: CLINIC | Age: 58
End: 2023-03-23
Payer: COMMERCIAL

## 2023-03-23 ENCOUNTER — APPOINTMENT (OUTPATIENT)
Dept: BREAST CENTER | Facility: CLINIC | Age: 58
End: 2023-03-23

## 2023-03-23 VITALS
HEART RATE: 74 BPM | DIASTOLIC BLOOD PRESSURE: 81 MMHG | WEIGHT: 197 LBS | BODY MASS INDEX: 31.66 KG/M2 | HEIGHT: 66 IN | SYSTOLIC BLOOD PRESSURE: 144 MMHG

## 2023-03-23 PROCEDURE — 99213 OFFICE O/P EST LOW 20 MIN: CPT

## 2023-03-23 NOTE — ASSESSMENT
[FreeTextEntry1] : This is a 57-year-old female with chronic cervical and lumbar pain. She is s/p x3 R L3-L5 SNRI w/mac on 1/19, 2/9 and 2/23 which provided her with 60-70% relief. Her pain is managable at this time. With regard to her cervical pain, I will move forward with a right C2-C5 medial branch block due to her continuing to have pain. She will follow up post injection.   I have explained the findings to the patient and all questions have been answered.\par \par Patient had paravertebral tenderness and pain on spinal movement with facet loading.  Patient has trialed rehab (home exercise, physical therapy or chiropractic care) and medications.  I will schedule a right diagnostic cervical medial branch injection C2 through  C5, if 70% immediate relief for greater than 30 minutes or 50% greater than 24 hours, would schedule RFA at lumbar medial branches.  If greater than 50% intermediate relief for 30 minutes, would schedule a second diagnostic cervical medial branch block, and if greater than 50% intermediate relief for 30 minutes, would schedule a RFA at lumbar medial branches.\par \par Risk, benefits, pros and cons of procedure were explained to the patient using models and diagrams and their questions were answered.\par \par The patient has severe anxiety of procedures that necessitates monitored anesthesia care (MAC). The procedure performed will be close to major nerves, arteries, and spinal cord and/or joint structures. Due to the proximity of these structures, we need the patient to be still during the procedure. With the help of MAC, this will be safely achieved and decrease the risk of any complications.\par \par \par I, Jackie Summers, attest that this documentation has been prepared under the direction and in the presence of Provider Basia Underwood MD.\par \par \par Thank you for allowing me to assist in the management of this patient. \par \par \par Best Regards, \par \par \par Basia Underwood M.D., [facility]\par \par \par Diplomate, American Board of Physical Medicine and Rehabilitation\par Diplomate, American Board of Pain Medicine \par Diplomate, American Board of Pain Management\par

## 2023-03-23 NOTE — DATA REVIEWED
[FreeTextEntry1] : MRI of the cervical spine dated September 14, 2022 shows mild  dextroscoliosis with mild rotary component and mild straightening of sagittal lordosis.  multilevel disc disease without spinal stenosis.  C3-4  central ridge disc focally indenting the spinal cord.  Bilateral foraminal osteophytes.  C4-5 central left central ridge disc.  Bilateral foraminal narrowing.  Bilateral root sleeve cyst.  C5-6 shallow left foraminal ridge  disc compresses the C6 root.  Right central small ridge disc.  Bilateral foraminal narrowing.  Bilateral small root sleeve cysts.  C6-7 6 shallow left central left foraminal protrusion disc impinges on the C7 root. \par \par  MRI of the lumbar spine dated September 14, 2022 shows  slight straightening of sagittal lordosis.  Multilevel spondylosis and lower lumbar facet arthrosis.  L5-1 left more than right mild foraminal stenosis.  L4-5 severe spinal stenosis central disc herniation extending to the right more than the left.  Bilateral lateral recess stenosis and bilateral foraminal stenosis.  L3-4 shallow left foraminal disc herniation.  L2-3 small left foraminal disc herniation. \par \par MRI C spine 10/25/2019 Impression: Findings are without appreciable change when compared to November 2017. Mild spinal curvature and mild reversal of lordosis. Multilevel spondylosis. Central C3-4 focal ridge disc indents the spinal cord. Left central C4-5 small disc herniation mildly indents the spinal cord. C5-6 small right central ridge/disc. C6-7 shallow left central ridge disc. \par \par MRI L spine 10/2019- Impression: Mild-to-moderate multilevel spondylosis and multilevel facet arthropathy in the setting of a developmentally borderline spinal canal. L4-5 right central and foraminal shallow disc herniation also extending left central. L3-4 shallow left lateral and foraminal disc herniation. L1-2 right foraminal shallow disc herniation. \par Comparison with October 2017, the L4-5 shallow right and left central disc herniation is more pronounced, otherwise, the findings are stable.\par \par SOAPP (a Screener and Opioid Assessment for Patients with Pain) 8/31/2022. She scored a 0, which is low risk. \par \par Medications that trigger a UDS: Benzodiazepines (Ativan, Xanax, Valium) etc. Barbiturates, Narcotics (Avinza, Butrans, hydrocodone, Codeine, Tala, Methadone, Morphine, MS Contin, Opana, oxycodone, OxyContin, Suboxone, etc.) Pregabalin (Lyrica), Tramadol (Ultracet, Ultram, etc.) Tapentadol (Nucynta) and E-list Drugs (cocaine, THC, etc.) \par  \par Patient has combination of a low risk SOAPP and no risk factors. UDS would be repeated randomly every quarter. \par  \par Risk factors: Bipolar illness, positive for any illicit drugs, history of ETOH and drug abuse, any signs of diversion, sharing medications, selling medications. Non-consistent [place] drug reporting and above 120 mEq of morphine. \par   \par [place] REGISTRY: Consistent. \par  \par UDS: No data collected\par

## 2023-03-23 NOTE — HISTORY OF PRESENT ILLNESS
[FreeTextEntry1] : ORIGINAL PRESENTATION:  Ms. Del Castillo is a 57-year-old woman who presents with a several year history of neck pain radiating in between the shoulder blades and into the left shoulder where she describes burning, stabbing, sharp pain around the left shoulder blade. She also complains of lower back pain, mostly right-sided, which radiates down the right leg to the foot with numbness and tingling. She states the pain is of undetermined origin, moderate to severe, it is nearly constant, in no typical pattern. She describes burning, shooting, sharp, pressure-like pain with numbness in the right leg. She denies any weakness in the upper or lower extremities.\par \par ACTIVITIES: Decreased pain with walking, exercise, relaxation. No change with lying down, standing, sitting, walking, exercise, coughing/sneezing, bowel movements. Can sit for 2 minutes, stand with no pain. She sometimes lies down due to pain. She does not use an assistive device. She is able to perform all AADL. \par \par PRIOR PAIN TREATMENTS: Nerve blocks and injections, specifically trigger point injects gave her no relief. Physical therapy, heat, cold, biofeedback gave her moderate relief. SOLA x 2, 50%relief. \par \par PRIOR PAIN MEDICATIONS: Tylenol, Vioxx, Duexis, Neurontin.\par \par \par TODAY: Patient presents today for a follow up. She is under our care for chronic cervical and lumbar pain with radiculopathy which she is receiving continuing active treatment for. She is s/p x3 R L3-L5 SNRI w/mac on 1/19, 2/9 and 2/23 which provided her with 60-70% relief.  She states the pain is localized to the R side of her lumbar spine radiating down into her leg into her knee and shin.  She rates the pain currently a 6/10 but states it can go as high as an 8/10. Her pain is manageable at this time. \par \par With regard to her cervical pain, she continues to have neck pain radiating in between the shoulder blades and into the right shoulder where she describes burning, stabbing, sharp pain around the right shoulder blade. She is interested in moving forward with injections at this time.  The past, she has undergone a right C2 to C5 medial branch blocks that always lasted for her and we did not need to proceed with RFA.  We will repeat right C2-C5 medial branch block at this time and hopefully this will provide her with good relief.\par

## 2023-04-18 ENCOUNTER — APPOINTMENT (OUTPATIENT)
Dept: PAIN MANAGEMENT | Facility: CLINIC | Age: 58
End: 2023-04-18
Payer: COMMERCIAL

## 2023-04-18 PROCEDURE — 00600 ANES PX CRV SPINE&CORD NOS: CPT | Mod: QZ,P2

## 2023-04-18 PROCEDURE — 64490 INJ PARAVERT F JNT C/T 1 LEV: CPT | Mod: RT

## 2023-04-18 PROCEDURE — 94761 N-INVAS EAR/PLS OXIMETRY MLT: CPT

## 2023-04-18 PROCEDURE — 93040 RHYTHM ECG WITH REPORT: CPT | Mod: 79

## 2023-04-18 PROCEDURE — 64491 INJ PARAVERT F JNT C/T 2 LEV: CPT | Mod: RT

## 2023-04-18 PROCEDURE — 64492 INJ PARAVERT F JNT C/T 3 LEV: CPT | Mod: RT

## 2023-04-18 PROCEDURE — 93770 DETERMINATION VENOUS PRESS: CPT | Mod: 59

## 2023-04-18 NOTE — PROCEDURE
[FreeTextEntry1] : CERVICAL MEDIAL BRANCH INJECTION UNDER FLUOROSCOPY\par  [FreeTextEntry3] : \par CERVICAL MEDIAL BRANCH INJECTION UNDER FLUOROSCOPY\par \par \par  \par \par Date:  2023\par \par Patient: Estela Del Castillo\par \par :  1965\par \par Preoperative Diagnosis: Cervical facet arthropathy.\par \par Postoperative Diagnosis: Cervical facet arthropathy.\par \par Procedure: Diagnostic right sided cervical median branch nerve injection, for C2-3, C3-4, and C4-5 under fluoroscopy\par \par Physician: Basia Underwood MD, FAAPMR\par \par Anesthesiologist/CRNA: Dr. Mondragon/ Ms. Tolentino\par \par Anesthesia: See nurses notes/MAC/Cold spray IV Sedation versed 2mg, Fentanyl 50mcg\par \par \par Medical Necessity:  Failure of conservative management.\par \par \par Indications:  The patient was admitted for a median branch injection for diagnostic purposes.  The patient was explained the technique, complications and rationale for the procedure and elected to proceed.\par \par \par Indication for Fluoroscopy:  This procedure requires the precise placement of the spinal needle.  It is the only way to accurately and safely perform the injection.\par \par \par CONSENT: The possible complications including infection, bleeding, nerve damage, hospital death or failure of the procedure; though unusual, are theoretically possible. The patient was educated about the of the procedure and alternative therapies. All questions were answered and the patient freely gave consent to proceed.\par \par \par \par \par Monitoring:  Patient had continuous blood pressure, EKG, and pulse oximetry throughout the case, Sugar 106 . See nurse's notes.\par \par  \par \par PROCEDURE NOTE: \par \par After obtaining written consent, the patient was positioned in a prone position on the fluoroscopy table. A chloraprep was performed and the area surrounded by sterile drapes. A time out was performed.  Fluoroscopy unit was positioned over the patient and images of the spine (AP views) obtained.  Cold spray was used to localize the area. At each level a 22guage 3 ½ inch spinal needle was placed at the level of the median branch to the facet under fluoroscopic guidance. This was performed by determining needle position based on aligning the needle point with the waist of each cervical vertebrate under tunnel vision.  A dose of lidocaine 2%, 1cc was administered at each level.  The needles at each level were withdrawn following administration of the medication. The needle was removed intact. A band aid was place on the site.\par \par  \par \par \par Complications: None. The patient tolerated the procedure well.\par \par  \par \par Disposition: I have examined the patient and there are no new physical findings since the original presentation.  Sensory and motor function were intact. The patient met discharge criteria see nurses notes. The discharge instruction sheet was reviewed and given to the patient. The patient was discharged home with a .\par \par \par \par \par Comment: If patient has a diagnostic median branch nerve injection with 70% relief greater than 2 hours or 50% greater than 24 hours would proceed to RFA. If 50% less than 24 hours would repeat to confirm for RFA. Follow in office. Call if any problems.\par \par   \par \par Indication for RFA: The pt had a positive response to medial branch diagnostic injections and is considered a good candidate for the thermal RF ablation procedure. The diagnostic injection provided at least 50% reduction in pain for the duration of the local anesthetic.\par \par The following criteria have been met: 1) failed response to at least 3 months of conservative management; 2) patient has LBP that is non-radicular, suggesting facet joint origin supported by absence of nerve root compression documented on the medical record on H&P and radiographic evaluation; 3) minimum of 6 months has elapsed since any prior RF treatments. If prior ablation therapy has been performed, it provided at least 50% relief for minimum of 10-12 weeks; 4) no prior spinal fusion at the vertebral level being treated;\par \par  \par \par This document was electronically signed by: \par \par Basia Underwood MD, FAAPMR\par \par Diplomate, American Board of Physical Medicine and Rehabilitation\par \par Diplomate, American Board of Pain Medicine\par \par \par

## 2023-04-25 ENCOUNTER — APPOINTMENT (OUTPATIENT)
Dept: PAIN MANAGEMENT | Facility: CLINIC | Age: 58
End: 2023-04-25
Payer: COMMERCIAL

## 2023-04-25 VITALS
HEIGHT: 66 IN | WEIGHT: 197 LBS | HEART RATE: 73 BPM | DIASTOLIC BLOOD PRESSURE: 90 MMHG | SYSTOLIC BLOOD PRESSURE: 151 MMHG | BODY MASS INDEX: 31.66 KG/M2

## 2023-04-25 PROCEDURE — 99213 OFFICE O/P EST LOW 20 MIN: CPT

## 2023-04-25 NOTE — DATA REVIEWED
[FreeTextEntry1] : MRI of the cervical spine dated September 14, 2022 shows mild  dextroscoliosis with mild rotary component and mild straightening of sagittal lordosis.  multilevel disc disease without spinal stenosis.  C3-4  central ridge disc focally indenting the spinal cord.  Bilateral foraminal osteophytes.  C4-5 central left central ridge disc.  Bilateral foraminal narrowing.  Bilateral root sleeve cyst.  C5-6 shallow left foraminal ridge  disc compresses the C6 root.  Right central small ridge disc.  Bilateral foraminal narrowing.  Bilateral small root sleeve cysts.  C6-7 6 shallow left central left foraminal protrusion disc impinges on the C7 root. \par \par  MRI of the lumbar spine dated September 14, 2022 shows  slight straightening of sagittal lordosis.  Multilevel spondylosis and lower lumbar facet arthrosis.  L5-1 left more than right mild foraminal stenosis.  L4-5 severe spinal stenosis central disc herniation extending to the right more than the left.  Bilateral lateral recess stenosis and bilateral foraminal stenosis.  L3-4 shallow left foraminal disc herniation.  L2-3 small left foraminal disc herniation. \par \par MRI C spine 10/25/2019 Impression: Findings are without appreciable change when compared to November 2017. Mild spinal curvature and mild reversal of lordosis. Multilevel spondylosis. Central C3-4 focal ridge disc indents the spinal cord. Left central C4-5 small disc herniation mildly indents the spinal cord. C5-6 small right central ridge/disc. C6-7 shallow left central ridge disc. \par \par MRI L spine 10/2019- Impression: Mild-to-moderate multilevel spondylosis and multilevel facet arthropathy in the setting of a developmentally borderline spinal canal. L4-5 right central and foraminal shallow disc herniation also extending left central. L3-4 shallow left lateral and foraminal disc herniation. L1-2 right foraminal shallow disc herniation. \par Comparison with October 2017, the L4-5 shallow right and left central disc herniation is more pronounced, otherwise, the findings are stable.\par \par SOAPP: low on 3/23/23\par Low risk: Patient has combination of a low risk SOAP and no risk factors. UDS would be repeated randomly every quarter.\par \par Medications that trigger a UDS: Benzodiazepines (Ativan, Xanax, Valium) etc. Barbiturates, Narcotics (Avinza, Butrans, hydrocodone, Codeine, Tala, Methadone, Morphine, MS Contin, Opana, oxycodone, OxyContin, Suboxone, etc.) Pregabalin (Lyrica), Tramadol (Ultracet, Ultram, etc.) Tapentadol (Nucynta) and E-list Drugs (cocaine, THC, etc.) \par  \par Patient has combination of a low risk SOAPP and no risk factors. UDS would be repeated randomly every quarter. \par  \par Risk factors: Bipolar illness, positive for any illicit drugs, history of ETOH and drug abuse, any signs of diversion, sharing medications, selling medications. Non-consistent [place] drug reporting and above 120 mEq of morphine. \par   \par [place] REGISTRY: Consistent. \par  \par UDS: No data collected.\par

## 2023-04-25 NOTE — ASSESSMENT
[FreeTextEntry1] : This is a 58-year-old female with chronic cervical and lumbar pain. She is s/p a diagnostic RT C2-C5 MBB w/ mac on 4/18/23 which provided her with significant relief of her symptoms. She is no longer experiencing pain in the neck therefore we will hold off on moving forward with a RT C2-C5 RFA until the pain returns. In the interim, her lower back and right buttock has become bothersome. Pain is likely stemming from the right SI joint. We will submit for a diagnostic RT SI joint injection and follow up afterwards.  All this patients questions were answered and the conversation was understood well.\par \par Patient had pain on PSIS area, Tiburcio's positive and pelvic rocking positive. Patient has trialed rehab (Home exercise, physical therapy or chiropractic care) and medications. We will schedule a right diagnostic and therapeutic sacroiliac joint injection. If greater than 50% relief for greater than 30 minutes, would do a second right sacroiliac joint injection in 1-2 weeks. With greater than 50% relief for 6-8 weeks, a right sacroiliac joint injection could be repeated in 3 months vs RFA or referral to neurosurgeon.\par \par ANESTHESIA PARAGRAPH: The patient has severe anxiety of procedures that necessitates monitored anesthesia care (MAC). The procedure performed will be close to major nerves, arteries, and spinal cord and/or joint structures. Due to the proximity of these structures, we need the patient to be still during the procedure. With the help of MAC, this will be safely achieved and decrease the risk of any complications.\par \par RISK AND BENEFIT PARAGRAPH: Risk, benefits, pros and cons of procedure were explained to the patient using models and diagrams and their questions were answered.\par \par I, Misti Duncan, attest that this documentation has been prepared under the direction and in the presence of Provider Basia Underwood MD.\par \par \par Thank you for allowing me to assist in the management of this patient. \par \par \par Best Regards, \par \par \par Basia Underwood M.D., [facility]\par \par \par Diplomate, American Board of Physical Medicine and Rehabilitation\par Diplomate, American Board of Pain Medicine \par Diplomate, American Board of Pain Management\par

## 2023-04-25 NOTE — HISTORY OF PRESENT ILLNESS
[FreeTextEntry1] : ORIGINAL PRESENTATION:  Ms. Del Castillo is a 58-year-old woman who presents with a several year history of neck pain radiating in between the shoulder blades and into the left shoulder where she describes burning, stabbing, sharp pain around the left shoulder blade. She also complains of lower back pain, mostly right-sided, which radiates down the right leg to the foot with numbness and tingling. She states the pain is of undetermined origin, moderate to severe, it is nearly constant, in no typical pattern. She describes burning, shooting, sharp, pressure-like pain with numbness in the right leg. She denies any weakness in the upper or lower extremities.\par \par ACTIVITIES: Decreased pain with walking, exercise, relaxation. No change with lying down, standing, sitting, walking, exercise, coughing/sneezing, bowel movements. Can sit for 2 minutes, stand with no pain. She sometimes lies down due to pain. She does not use an assistive device. She is able to perform all AADL. \par \par PRIOR PAIN TREATMENTS: Nerve blocks and injections, specifically trigger point injects gave her no relief. Physical therapy, heat, cold, biofeedback gave her moderate relief. SOLA x 2, 50%relief. \par \par PRIOR PAIN MEDICATIONS: Tylenol, Vioxx, Duexis, Neurontin.\par \par \par PATIENT PRESENTS FOR FOLLOW UP: She is under our care for chronic cervical and lumbar pain with radiculopathy which she is receiving continuing active treatment for. She is s/p x3 R L3-L5 SNRI w/mac on 1/19, 2/9 and 2/23 which provided her with no relief of her symptoms. She states the pain is localized to the R side of her lumbar spine radiating down into her leg into her knee and shin. She is experiencing pain the right lower back that travels into the buttock and wraps around to the front of the hip to about the knee.The pain is described as a pulling sensation. Pain is possibly coming from the RT SI Joint. The option to move forward with a diagnostic RT SI joint injection was discussed, as an alternative treatment, and she is agreeable to move forward. \par \par With regard to her cervical pain, she continues to have neck pain radiating in between the shoulder blades and into the right shoulder where she describes burning, stabbing, sharp pain around the right shoulder blade. She is s/p a diagnostic RT C2-C5 MBB w/ mac on 4/18/23 which provided her with significant relief of her symptoms. The day following the injection she was having pain in her face and neck. 2 days following the injection, the pain decreased and she no longer experiencing pain at this time. We will hold off on moving forward with the RT C2-C5 RFA until her pain returns.

## 2023-04-25 NOTE — PHYSICAL EXAM
[Normal Coordination] : normal coordination [Normal DTR UE/LE] : normal DTR UE/LE  [Normal Sensation] : normal sensation [Normal Mood and Affect] : normal mood and affect [Orientated] : orientated [Able to Communicate] : able to communicate [Well Developed] : well developed [Well Nourished] : well nourished [Flexion] : flexion [Extension] : extension [] : no swelling [FreeTextEntry8] : Jsoe F testing is positive for reproduction of pain at the PSIS, and there is a positive Estelita Finger test and a positive Gaenslen's test on the right.

## 2023-04-27 ENCOUNTER — APPOINTMENT (OUTPATIENT)
Dept: PAIN MANAGEMENT | Facility: CLINIC | Age: 58
End: 2023-04-27

## 2023-05-12 ENCOUNTER — APPOINTMENT (OUTPATIENT)
Dept: PAIN MANAGEMENT | Facility: CLINIC | Age: 58
End: 2023-05-12
Payer: COMMERCIAL

## 2023-05-12 PROCEDURE — 01992 ANES DX/THER NRV BLK&INJ PRN: CPT | Mod: QZ,P1

## 2023-05-12 PROCEDURE — 93770 DETERMINATION VENOUS PRESS: CPT

## 2023-05-12 PROCEDURE — 94761 N-INVAS EAR/PLS OXIMETRY MLT: CPT | Mod: 59

## 2023-05-12 PROCEDURE — 27096 INJECT SACROILIAC JOINT: CPT | Mod: RT

## 2023-05-12 PROCEDURE — 93040 RHYTHM ECG WITH REPORT: CPT | Mod: 79,59

## 2023-05-12 NOTE — PROCEDURE
[FreeTextEntry1] : SACROILIAC JOINT INJECTION UNDER FLUOROSCOPY [FreeTextEntry3] : Date:  2023\par \par Patient: Estela Del Castillo\par \par :  1965\par \par \par Preoperative Diagnosis: Right  SIJ Arthropathy\par \par Procedure: Right SIJ Injection under Fluoroscopy\par \par Physician: Basia Underwood MD, FAAPMR\par \par \par \par Anesthesiologist/CRNA: Mr. Modi\par \par Anesthesia: See nurses note/MAC/Cold spray. Versed 6mg\par \par \par \par Medical Necessity:  Failure of conservative management.\par \par \par \par Indications:  The patient was admitted for a median branch injection for diagnostic purposes.  The patient was explained the technique, complications and rationale for the procedure and elected to proceed.\par \par \par \par Indication for Fluoroscopy:  This procedure requires the precise placement of the spinal needle.  It is the only way to accurately and safely perform the injection.\par \par \par \par CONSENT: The possible complications including infection, bleeding, nerve damage, Hospital admission, death or failure of the procedure; though unusual, are theoretically possible. The patient was educated about the of the procedure and alternative therapies. All questions were answered and the patient freely gave consent to proceed.\par \par \par \par Monitoring:  Patient had continuous blood pressure, EKG, and pulse oximetry throughout the case. See nurse's notes\par \par  \par \par PROCEDURE NOTE:\par \par After obtaining written consent, the patient was placed on the fluoroscopic table in the prone position  A time out was performed.  Fluoroscopic guidance was used to isolate and identify the Right sacroiliac joint.  A medial to lateral oblique projection allowed separation of the anterior (lateral) and posterior (medial) joint space.  The sacrum and posterior iliac crest was prepped with Betadine and draped in usual sterile fashion. Cold spray was used to localize the area. A 22-gauge 3.5 inch spinal needle was directed into the inferior aspect of the sacroiliac joint using a posterior approach in bull's eye fashion. The interosseous ligament was engaged which provoked responses consisting of her typical painful symptoms. A 2 cc total volume of lidocaine 2% ( 1 ½ cc) and depomedrol 40mg (1/2 cc) was injected. Volumes were kept small-commensurate with the joint's capacity as determined by end-injection back pressure on the syringe. The needle was removed.\par \par  \par \par Sacro-iliac Joint Radiography:\par \par Omnipaque 240mg/cc - 1/2 cc was injected in the inferior pole of the SIJ and the entire sacroiliac joint was outlined under fluoroscopy.\par \par  \par \par Complications: None. The patient tolerated the procedure well.\par \par  \par \par Disposition: I have examined the patient and there are no new physical findings since the original presentation.  Sensory and motor function were intact. The patient met discharge criteria see nurses notes. The discharge instruction sheet was reviewed and given to the patient. The patient was discharged home with a . If patient gets sustained relief will have patient do modified planks 3 times a day on carpet or yoga mat starting at 5 seconds building up to 1 minute without grimacing/Valsalva and walking.\par \par If patient gets sustained relief will have patient do modified planks 3 times a day on carpet or yoga mat starting at 5 seconds building up to 1 minute without grimacing/Valsalva and walking.\par \par  \par \par Comment: If 70% relief greater than 2 hours or 50% greater than 24 hours would proceed to RFA. If 50% less than 24 hours would repeat to confirm for RFA. Follow in office. Call if any problems.\par \par \par \par  \par \par This document was electronically signed by: \par \par Basia Underwood MD, FAAPMR\par Diplomate, American Board of Physical Medicine and Rehabilitation\par Diplomate, American Board of Pain Medicine\par \par

## 2023-05-24 ENCOUNTER — APPOINTMENT (OUTPATIENT)
Dept: PAIN MANAGEMENT | Facility: CLINIC | Age: 58
End: 2023-05-24
Payer: COMMERCIAL

## 2023-05-24 VITALS
SYSTOLIC BLOOD PRESSURE: 150 MMHG | WEIGHT: 197 LBS | HEART RATE: 61 BPM | HEIGHT: 66 IN | BODY MASS INDEX: 31.66 KG/M2 | DIASTOLIC BLOOD PRESSURE: 98 MMHG

## 2023-05-24 DIAGNOSIS — M54.50 LOW BACK PAIN, UNSPECIFIED: ICD-10-CM

## 2023-05-24 DIAGNOSIS — M54.2 CERVICALGIA: ICD-10-CM

## 2023-05-24 DIAGNOSIS — M46.92 UNSPECIFIED INFLAMMATORY SPONDYLOPATHY, CERVICAL REGION: ICD-10-CM

## 2023-05-24 DIAGNOSIS — M54.16 RADICULOPATHY, LUMBAR REGION: ICD-10-CM

## 2023-05-24 PROCEDURE — 99213 OFFICE O/P EST LOW 20 MIN: CPT

## 2023-05-24 NOTE — ASSESSMENT
[FreeTextEntry1] : This is a 58-year-old female with chronic cervical and lumbar pain. She is s/p a RT SI Joint injection w/ mac on 5/12/23 which provided her with no relief of her symptoms. She trialed prior cervical epidural steroid injection approaches which also provided her no relief. From a pain management standpoint there is no further interventions to trial at this time. I will refer her to Dr. Mondragon to discuss possible surgical intervention options. All this patients questions were answered and the conversation was understood well.\par \par I recommend speaking with Neurosurgery for further evaluation.\par \par I, Misti Duncan, attest that this documentation has been prepared under the direction and in the presence of Provider Basia Underwood MD.\par \par \par Thank you for allowing me to assist in the management of this patient. \par \par \par Best Regards, \par \par \par Basia Underwood M.D., [facility]\par \par \par Diplomate, American Board of Physical Medicine and Rehabilitation\par Diplomate, American Board of Pain Medicine \par Diplomate, American Board of Pain Management\par

## 2023-05-24 NOTE — PHYSICAL EXAM
[Normal Coordination] : normal coordination [Normal DTR UE/LE] : normal DTR UE/LE  [Normal Sensation] : normal sensation [Normal Mood and Affect] : normal mood and affect [Orientated] : orientated [Able to Communicate] : able to communicate [Well Developed] : well developed [Well Nourished] : well nourished [Flexion] : flexion [Extension] : extension [] : no swelling [FreeTextEntry8] : Jose F testing is positive for reproduction of pain at the PSIS, and there is a positive Estelita Finger test and a positive Gaenslen's test on the right.

## 2023-05-24 NOTE — HISTORY OF PRESENT ILLNESS
[FreeTextEntry1] : ORIGINAL PRESENTATION:  Ms. Del Castillo is a 58-year-old woman who presents with a several year history of neck pain radiating in between the shoulder blades and into the left shoulder where she describes burning, stabbing, sharp pain around the left shoulder blade. She also complains of lower back pain, mostly right-sided, which radiates down the right leg to the foot with numbness and tingling. She states the pain is of undetermined origin, moderate to severe, it is nearly constant, in no typical pattern. She describes burning, shooting, sharp, pressure-like pain with numbness in the right leg. She denies any weakness in the upper or lower extremities.\par \par ACTIVITIES: Decreased pain with walking, exercise, relaxation. No change with lying down, standing, sitting, walking, exercise, coughing/sneezing, bowel movements. Can sit for 2 minutes, stand with no pain. She sometimes lies down due to pain. She does not use an assistive device. She is able to perform all AADL. \par \par PRIOR PAIN TREATMENTS: Nerve blocks and injections, specifically trigger point injects gave her no relief. Physical therapy, heat, cold, biofeedback gave her moderate relief. SOLA x 2, 50%relief. \par \par PRIOR PAIN MEDICATIONS: Tylenol, Vioxx, Duexis, Neurontin.\par \par PATIENT PRESENTS FOR FOLLOW UP: She is under our care for chronic cervical and lumbar pain with radiculopathy which she is receiving continuing active treatment for. She is s/p x3 R L3-L5 SNRI w/mac on 1/19, 2/9 and 2/23 which provided her with no relief of her symptoms. She states the pain is localized to the R side of her lumbar spine radiating down into her leg into her knee and shin. She is experiencing pain the right lower back that travels into the buttock and wraps around to the front of the hip to about the knee.The pain is described as a pulling sensation.  She is s/p a RT SI Joint injection w/ mac on 5/12/23 which provided her with no relief of her symptoms. She continues to experience a shooting pain and "shaking" in the leg. \par \par With regard to her cervical pain, she continues to have neck pain radiating in between the shoulder blades and into the right shoulder where she describes burning, stabbing, sharp pain around the right shoulder blade. She is s/p a diagnostic RT C2-C5 MBB w/ mac on 4/18/23 which provided her with significant relief of her symptoms. The day following the injection she was having pain in her face and neck. 2 days following the injection, the pain decreased and she no longer experiencing pain at this time. We will hold off on moving forward with the RT C2-C5 RFA until her pain returns.

## 2023-06-09 DIAGNOSIS — Z12.39 ENCOUNTER FOR OTHER SCREENING FOR MALIGNANT NEOPLASM OF BREAST: ICD-10-CM

## 2023-06-12 ENCOUNTER — APPOINTMENT (OUTPATIENT)
Dept: OBGYN | Facility: CLINIC | Age: 58
End: 2023-06-12
Payer: COMMERCIAL

## 2023-06-12 VITALS
SYSTOLIC BLOOD PRESSURE: 131 MMHG | DIASTOLIC BLOOD PRESSURE: 84 MMHG | BODY MASS INDEX: 31.18 KG/M2 | HEIGHT: 66 IN | WEIGHT: 194 LBS | HEART RATE: 77 BPM

## 2023-06-12 DIAGNOSIS — Z01.419 ENCOUNTER FOR GYNECOLOGICAL EXAMINATION (GENERAL) (ROUTINE) W/OUT ABNORMAL FINDINGS: ICD-10-CM

## 2023-06-12 DIAGNOSIS — Z01.411 ENCOUNTER FOR GYNECOLOGICAL EXAMINATION (GENERAL) (ROUTINE) WITH ABNORMAL FINDINGS: ICD-10-CM

## 2023-06-12 DIAGNOSIS — H40.9 UNSPECIFIED GLAUCOMA: ICD-10-CM

## 2023-06-12 DIAGNOSIS — N94.11 SUPERFICIAL (INTROITAL) DYSPAREUNIA: ICD-10-CM

## 2023-06-12 LAB
BILIRUB UR QL STRIP: NEGATIVE
CLARITY UR: CLEAR
COLLECTION METHOD: NORMAL
GLUCOSE UR-MCNC: NEGATIVE
HCG UR QL: 0.2 EU/DL
HGB UR QL STRIP.AUTO: NEGATIVE
KETONES UR-MCNC: NEGATIVE
LEUKOCYTE ESTERASE UR QL STRIP: NORMAL
NITRITE UR QL STRIP: NEGATIVE
PH UR STRIP: 6
PROT UR STRIP-MCNC: NEGATIVE
SP GR UR STRIP: 1.02

## 2023-06-12 PROCEDURE — 99396 PREV VISIT EST AGE 40-64: CPT

## 2023-06-12 PROCEDURE — 81003 URINALYSIS AUTO W/O SCOPE: CPT | Mod: QW

## 2023-06-12 RX ORDER — ESTRADIOL 0.1 MG/G
0.1 CREAM VAGINAL
Qty: 1 | Refills: 1 | Status: ACTIVE | COMMUNITY
Start: 2023-06-12 | End: 1900-01-01

## 2023-06-12 RX ORDER — CONJUGATED ESTROGENS 0.62 MG/G
0.62 CREAM VAGINAL
Qty: 1 | Refills: 2 | Status: DISCONTINUED | COMMUNITY
Start: 2022-03-03 | End: 2023-06-12

## 2023-06-12 NOTE — PHYSICAL EXAM
[Chaperone Present] : A chaperone was present in the examining room during all aspects of the physical examination [Appropriately responsive] : appropriately responsive [Alert] : alert [No Acute Distress] : no acute distress [Soft] : soft [Non-tender] : non-tender [Non-distended] : non-distended [Oriented x3] : oriented x3 [Examination Of The Breasts] : a normal appearance [No Masses] : no breast masses were palpable [Vulvar Atrophy] : vulvar atrophy [Labia Majora] : normal [Labia Minora] : normal [Atrophy] : atrophy [No Bleeding] : There was no active vaginal bleeding [Normal] : normal [Uterine Adnexae] : normal

## 2023-06-12 NOTE — HISTORY OF PRESENT ILLNESS
[Patient reported PAP Smear was normal] : Patient reported PAP Smear was normal [Menopause Age: ____] : age at menopause was [unfilled] [Y] : Patient is sexually active [Monogamous (Male Partner)] : is monogamous with a male partner [Menstrual Cramps] : menstrual cramps [Currently Active] : currently active [Men] : men [FreeTextEntry1] : annual exam\par pt never started premarin cream, her insurance didn’t cover the cost of the medication [LMP unknown] : LMP unknown [postmenopausal] : postmenopausal [Mammogramdate] : 3/14/22 [TextBox_19] : Breast Imaging [PapSmeardate] : 3/3/22 [Difficulty with [place]] : difficulty with intercourse [No] : none [Options Discussed] : options discussed [TextBox_6] : vaginal dryness [Currently In Menopause] : currently in menopause [Experiencing Menopausal Sxs] : experiencing menopausal symptoms

## 2023-06-15 LAB — CYTOLOGY CVX/VAG DOC THIN PREP: ABNORMAL

## 2023-06-20 NOTE — ED ADULT NURSE NOTE - OBJECTIVE STATEMENT
patient c/o of allergic reaction symptoms that woke her up from her sleep. patient states she felt like her throat was closing, took 50 mg of benadryl and got some relief. patient voice horse states it got better, however has had cold s/s for a few days. Pvt home, 5-6 steps to enter. (I) ADLs and functional transfers without AD/DME/children/spouse

## 2023-08-11 ENCOUNTER — OUTPATIENT (OUTPATIENT)
Dept: OUTPATIENT SERVICES | Facility: HOSPITAL | Age: 58
LOS: 1 days | End: 2023-08-11
Payer: COMMERCIAL

## 2023-08-11 ENCOUNTER — RESULT REVIEW (OUTPATIENT)
Age: 58
End: 2023-08-11

## 2023-08-11 DIAGNOSIS — Z12.31 ENCOUNTER FOR SCREENING MAMMOGRAM FOR MALIGNANT NEOPLASM OF BREAST: ICD-10-CM

## 2023-08-11 DIAGNOSIS — R92.2 INCONCLUSIVE MAMMOGRAM: ICD-10-CM

## 2023-08-11 PROCEDURE — 76641 ULTRASOUND BREAST COMPLETE: CPT | Mod: 50

## 2023-08-11 PROCEDURE — 76641 ULTRASOUND BREAST COMPLETE: CPT | Mod: 26,50

## 2023-08-11 PROCEDURE — 77063 BREAST TOMOSYNTHESIS BI: CPT | Mod: 26

## 2023-08-11 PROCEDURE — 77067 SCR MAMMO BI INCL CAD: CPT

## 2023-08-11 PROCEDURE — 77063 BREAST TOMOSYNTHESIS BI: CPT

## 2023-08-11 PROCEDURE — 77067 SCR MAMMO BI INCL CAD: CPT | Mod: 26

## 2023-08-12 DIAGNOSIS — Z12.31 ENCOUNTER FOR SCREENING MAMMOGRAM FOR MALIGNANT NEOPLASM OF BREAST: ICD-10-CM

## 2023-08-12 DIAGNOSIS — R92.2 INCONCLUSIVE MAMMOGRAM: ICD-10-CM

## 2023-09-14 ENCOUNTER — OUTPATIENT (OUTPATIENT)
Dept: OUTPATIENT SERVICES | Facility: HOSPITAL | Age: 58
LOS: 1 days | End: 2023-09-14
Payer: COMMERCIAL

## 2023-09-14 DIAGNOSIS — R07.9 CHEST PAIN, UNSPECIFIED: ICD-10-CM

## 2023-09-14 PROCEDURE — 71046 X-RAY EXAM CHEST 2 VIEWS: CPT

## 2023-09-14 PROCEDURE — 71046 X-RAY EXAM CHEST 2 VIEWS: CPT | Mod: 26

## 2023-09-15 DIAGNOSIS — R07.9 CHEST PAIN, UNSPECIFIED: ICD-10-CM

## 2024-07-25 ENCOUNTER — APPOINTMENT (OUTPATIENT)
Dept: OBGYN | Facility: CLINIC | Age: 59
End: 2024-07-25
Payer: COMMERCIAL

## 2024-07-25 ENCOUNTER — LABORATORY RESULT (OUTPATIENT)
Age: 59
End: 2024-07-25

## 2024-07-25 VITALS
WEIGHT: 206 LBS | SYSTOLIC BLOOD PRESSURE: 157 MMHG | HEART RATE: 90 BPM | HEIGHT: 66 IN | DIASTOLIC BLOOD PRESSURE: 91 MMHG | BODY MASS INDEX: 33.11 KG/M2

## 2024-07-25 DIAGNOSIS — N94.11 SUPERFICIAL (INTROITAL) DYSPAREUNIA: ICD-10-CM

## 2024-07-25 DIAGNOSIS — R82.998 OTHER ABNORMAL FINDINGS IN URINE: ICD-10-CM

## 2024-07-25 DIAGNOSIS — Z01.419 ENCOUNTER FOR GYNECOLOGICAL EXAMINATION (GENERAL) (ROUTINE) W/OUT ABNORMAL FINDINGS: ICD-10-CM

## 2024-07-25 LAB
BILIRUB UR QL STRIP: NORMAL
CLARITY UR: CLEAR
COLLECTION METHOD: NORMAL
GLUCOSE UR-MCNC: NORMAL
HCG UR QL: 0.2 EU/DL
HGB UR QL STRIP.AUTO: ABNORMAL
KETONES UR-MCNC: NORMAL
LEUKOCYTE ESTERASE UR QL STRIP: ABNORMAL
NITRITE UR QL STRIP: NORMAL
PH UR STRIP: 6
PROT UR STRIP-MCNC: NORMAL
SP GR UR STRIP: 1.02

## 2024-07-25 PROCEDURE — 99396 PREV VISIT EST AGE 40-64: CPT | Mod: 25

## 2024-07-25 PROCEDURE — 81003 URINALYSIS AUTO W/O SCOPE: CPT | Mod: QW

## 2024-07-25 PROCEDURE — 99459 PELVIC EXAMINATION: CPT

## 2024-07-25 NOTE — PHYSICAL EXAM
[Chaperone Present] : A chaperone was present in the examining room during all aspects of the physical examination [07407] : A chaperone was present during the pelvic exam. [Appropriately responsive] : appropriately responsive [Alert] : alert [No Acute Distress] : no acute distress [Soft] : soft [Non-tender] : non-tender [Non-distended] : non-distended [Oriented x3] : oriented x3 [Examination Of The Breasts] : a normal appearance [No Discharge] : no discharge [No Masses] : no breast masses were palpable [Vulvar Atrophy] : vulvar atrophy [Labia Majora] : normal [Labia Minora] : normal [Atrophy] : atrophy [No Bleeding] : There was no active vaginal bleeding [Normal] : normal [Uterine Adnexae] : normal

## 2024-07-25 NOTE — HISTORY OF PRESENT ILLNESS
[FreeTextEntry1] : annual exam pt never started premarin cream, her insurance didn't cover the cost of the medication [Patient reported PAP Smear was normal] : Patient reported PAP Smear was normal [LMP unknown] : LMP unknown [postmenopausal] : postmenopausal [Y] : Patient is sexually active [Monogamous (Male Partner)] : is monogamous with a male partner [Mammogramdate] : 3/14/22 [TextBox_19] : Breast Imaging [PapSmeardate] : 3/3/22 [Options Discussed] : options discussed [TextBox_6] : vaginal dryness [Currently In Menopause] : currently in menopause [Post-Menopause, No Sxs] : post-menopausal, currently without symptoms [Menopause Age: ____] : age at menopause was [unfilled] [Currently Active] : currently active [Men] : men [No] : No

## 2024-07-28 LAB
APPEARANCE: CLEAR
BACTERIA UR CULT: NORMAL
BILIRUBIN URINE: NEGATIVE
BLOOD URINE: NEGATIVE
COLOR: ABNORMAL
GLUCOSE QUALITATIVE U: NEGATIVE
KETONES URINE: NEGATIVE
LEUKOCYTE ESTERASE URINE: ABNORMAL
NITRITE URINE: NEGATIVE
PH URINE: 6
PROTEIN URINE: NEGATIVE
SPECIFIC GRAVITY URINE: >=1.03
UROBILINOGEN URINE: NORMAL

## 2024-07-29 LAB — HPV HIGH+LOW RISK DNA PNL CVX: NOT DETECTED

## 2024-08-01 LAB — CYTOLOGY CVX/VAG DOC THIN PREP: ABNORMAL

## 2024-08-05 ENCOUNTER — NON-APPOINTMENT (OUTPATIENT)
Age: 59
End: 2024-08-05

## 2024-09-24 ENCOUNTER — APPOINTMENT (RX ONLY)
Dept: URBAN - METROPOLITAN AREA CLINIC 130 | Facility: CLINIC | Age: 59
Setting detail: DERMATOLOGY
End: 2024-09-24

## 2024-09-24 DIAGNOSIS — L82.1 OTHER SEBORRHEIC KERATOSIS: ICD-10-CM

## 2024-09-24 DIAGNOSIS — L72.8 OTHER FOLLICULAR CYSTS OF THE SKIN AND SUBCUTANEOUS TISSUE: ICD-10-CM

## 2024-09-24 DIAGNOSIS — D17 BENIGN LIPOMATOUS NEOPLASM: ICD-10-CM

## 2024-09-24 DIAGNOSIS — L81.4 OTHER MELANIN HYPERPIGMENTATION: ICD-10-CM

## 2024-09-24 DIAGNOSIS — L57.8 OTHER SKIN CHANGES DUE TO CHRONIC EXPOSURE TO NONIONIZING RADIATION: ICD-10-CM

## 2024-09-24 DIAGNOSIS — D18.0 HEMANGIOMA: ICD-10-CM

## 2024-09-24 DIAGNOSIS — D22 MELANOCYTIC NEVI: ICD-10-CM

## 2024-09-24 PROBLEM — D22.62 MELANOCYTIC NEVI OF LEFT UPPER LIMB, INCLUDING SHOULDER: Status: ACTIVE | Noted: 2024-09-24

## 2024-09-24 PROBLEM — D18.01 HEMANGIOMA OF SKIN AND SUBCUTANEOUS TISSUE: Status: ACTIVE | Noted: 2024-09-24

## 2024-09-24 PROBLEM — D17.1 BENIGN LIPOMATOUS NEOPLASM OF SKIN AND SUBCUTANEOUS TISSUE OF TRUNK: Status: ACTIVE | Noted: 2024-09-24

## 2024-09-24 PROBLEM — D22.61 MELANOCYTIC NEVI OF RIGHT UPPER LIMB, INCLUDING SHOULDER: Status: ACTIVE | Noted: 2024-09-24

## 2024-09-24 PROBLEM — D22.5 MELANOCYTIC NEVI OF TRUNK: Status: ACTIVE | Noted: 2024-09-24

## 2024-09-24 PROCEDURE — ? DEFER

## 2024-09-24 PROCEDURE — ? COUNSELING

## 2024-09-24 PROCEDURE — ? SUNSCREEN RECOMMENDATIONS

## 2024-09-24 PROCEDURE — ? PRESCRIPTION

## 2024-09-24 PROCEDURE — ? PRESCRIPTION MEDICATION MANAGEMENT

## 2024-09-24 PROCEDURE — 99203 OFFICE O/P NEW LOW 30 MIN: CPT

## 2024-09-24 RX ORDER — HYDROQUINONE 8 %
SMALL AMOUNT EMULSION (GRAM) TOPICAL QHS
Qty: 30 | Refills: 1 | Status: ERX | COMMUNITY
Start: 2024-09-24

## 2024-09-24 RX ORDER — TRETIONIN 0.25 MG/G
PEA SIZED AMOUNT CREAM TOPICAL AS DIRECTED
Qty: 20 | Refills: 3 | Status: ERX | COMMUNITY
Start: 2024-09-24

## 2024-09-24 RX ADMIN — Medication SMALL AMOUNT: at 00:00

## 2024-09-24 RX ADMIN — TRETIONIN PEA SIZED AMOUNT: 0.25 CREAM TOPICAL at 00:00

## 2024-09-24 ASSESSMENT — LOCATION SIMPLE DESCRIPTION DERM
LOCATION SIMPLE: RIGHT UPPER BACK
LOCATION SIMPLE: ABDOMEN
LOCATION SIMPLE: LEFT UPPER BACK
LOCATION SIMPLE: RIGHT CHEEK
LOCATION SIMPLE: LEFT LOWER BACK
LOCATION SIMPLE: LEFT FOREARM
LOCATION SIMPLE: LEFT POSTERIOR UPPER ARM
LOCATION SIMPLE: RIGHT LOWER BACK
LOCATION SIMPLE: RIGHT FOREARM
LOCATION SIMPLE: CHEST
LOCATION SIMPLE: INFERIOR FOREHEAD
LOCATION SIMPLE: LEFT CHEEK
LOCATION SIMPLE: LEFT UPPER ARM
LOCATION SIMPLE: RIGHT POSTERIOR UPPER ARM
LOCATION SIMPLE: CHIN
LOCATION SIMPLE: RIGHT UPPER ARM

## 2024-09-24 ASSESSMENT — LOCATION DETAILED DESCRIPTION DERM
LOCATION DETAILED: LEFT LATERAL ABDOMEN
LOCATION DETAILED: RIGHT LATERAL SUPERIOR CHEST
LOCATION DETAILED: MIDDLE STERNUM
LOCATION DETAILED: RIGHT LATERAL ABDOMEN
LOCATION DETAILED: INFERIOR MID FOREHEAD
LOCATION DETAILED: LEFT PROXIMAL POSTERIOR UPPER ARM
LOCATION DETAILED: LEFT ANTERIOR PROXIMAL UPPER ARM
LOCATION DETAILED: LEFT PROXIMAL DORSAL FOREARM
LOCATION DETAILED: RIGHT SUPERIOR LATERAL LOWER BACK
LOCATION DETAILED: LEFT LATERAL SUPERIOR CHEST
LOCATION DETAILED: LEFT SUPERIOR LATERAL LOWER BACK
LOCATION DETAILED: RIGHT PROXIMAL DORSAL FOREARM
LOCATION DETAILED: LEFT INFERIOR CENTRAL MALAR CHEEK
LOCATION DETAILED: LEFT MID-UPPER BACK
LOCATION DETAILED: RIGHT INFERIOR CENTRAL MALAR CHEEK
LOCATION DETAILED: RIGHT MID-UPPER BACK
LOCATION DETAILED: LOWER STERNUM
LOCATION DETAILED: LEFT SUPERIOR UPPER BACK
LOCATION DETAILED: RIGHT MENTAL CREASE
LOCATION DETAILED: RIGHT PROXIMAL POSTERIOR UPPER ARM
LOCATION DETAILED: RIGHT ANTERIOR PROXIMAL UPPER ARM

## 2024-09-24 ASSESSMENT — LOCATION ZONE DERM
LOCATION ZONE: TRUNK
LOCATION ZONE: FACE
LOCATION ZONE: ARM

## 2024-09-24 NOTE — PROCEDURE: PRESCRIPTION MEDICATION MANAGEMENT
Initiate Treatment: tretinoin 0.025 % topical cream As directed\\nQuantity: 20.0 g  Days Supply: 30\\nSig: Apply pea sized amount to face 2-3 nights per week, working up to qhs as tolerated
Detail Level: Zone
Render In Strict Bullet Format?: No
Initiate Treatment: [place] 8 % topical emulsion Qhs\\nQuantity: 30.0 g  Days Supply: 30\\nSig: Apply to affected dark areas (avoiding healthy skin) qhs for 2 months on/1 month off until resolved

## 2024-09-24 NOTE — PROCEDURE: DEFER
Introduction Text (Please End With A Colon): The following procedure was deferred:
X Size Of Lesion In Cm (Optional): 0
Reason To Defer Override: refer to general surgeon due to size
Detail Level: Detailed
Procedure To Be Performed At Next Visit: Excision
Size Of Lesion In Cm (Optional): 10
Procedure To Be Performed At Next Visit: Punch Excision

## 2024-12-10 ENCOUNTER — NON-APPOINTMENT (OUTPATIENT)
Age: 59
End: 2024-12-10

## 2025-01-24 ENCOUNTER — NON-APPOINTMENT (OUTPATIENT)
Age: 60
End: 2025-01-24

## 2025-09-03 ENCOUNTER — NON-APPOINTMENT (OUTPATIENT)
Age: 60
End: 2025-09-03